# Patient Record
Sex: MALE | Race: WHITE | NOT HISPANIC OR LATINO | Employment: FULL TIME | URBAN - METROPOLITAN AREA
[De-identification: names, ages, dates, MRNs, and addresses within clinical notes are randomized per-mention and may not be internally consistent; named-entity substitution may affect disease eponyms.]

---

## 2017-03-09 ENCOUNTER — HOSPITAL ENCOUNTER (OUTPATIENT)
Dept: LAB | Facility: MEDICAL CENTER | Age: 37
End: 2017-03-09
Attending: INTERNAL MEDICINE
Payer: COMMERCIAL

## 2017-03-09 ENCOUNTER — TELEPHONE (OUTPATIENT)
Dept: MEDICAL GROUP | Facility: MEDICAL CENTER | Age: 37
End: 2017-03-09

## 2017-03-09 DIAGNOSIS — Z00.00 PREVENTATIVE HEALTH CARE: Chronic | ICD-10-CM

## 2017-03-09 LAB
25(OH)D3 SERPL-MCNC: 24 NG/ML (ref 30–100)
ALBUMIN SERPL BCP-MCNC: 4.4 G/DL (ref 3.2–4.9)
ALBUMIN/GLOB SERPL: 1.5 G/DL
ALP SERPL-CCNC: 53 U/L (ref 30–99)
ALT SERPL-CCNC: 20 U/L (ref 2–50)
ANION GAP SERPL CALC-SCNC: 5 MMOL/L (ref 0–11.9)
AST SERPL-CCNC: 21 U/L (ref 12–45)
BASOPHILS # BLD AUTO: 0.07 K/UL (ref 0–0.12)
BASOPHILS NFR BLD AUTO: 1.1 % (ref 0–1.8)
BILIRUB SERPL-MCNC: 0.7 MG/DL (ref 0.1–1.5)
BUN SERPL-MCNC: 14 MG/DL (ref 8–22)
CALCIUM SERPL-MCNC: 9.7 MG/DL (ref 8.5–10.5)
CHLORIDE SERPL-SCNC: 104 MMOL/L (ref 96–112)
CHOLEST SERPL-MCNC: 179 MG/DL (ref 100–199)
CO2 SERPL-SCNC: 30 MMOL/L (ref 20–33)
CREAT SERPL-MCNC: 0.96 MG/DL (ref 0.5–1.4)
EOSINOPHIL # BLD: 0.22 K/UL (ref 0–0.51)
EOSINOPHIL NFR BLD AUTO: 3.4 % (ref 0–6.9)
ERYTHROCYTE [DISTWIDTH] IN BLOOD BY AUTOMATED COUNT: 43.5 FL (ref 35.9–50)
GLOBULIN SER CALC-MCNC: 3 G/DL (ref 1.9–3.5)
GLUCOSE SERPL-MCNC: 81 MG/DL (ref 65–99)
HBV SURFACE AB SER RIA-ACNC: 626.57 MIU/ML (ref 0–10)
HCT VFR BLD AUTO: 48.6 % (ref 42–52)
HCV AB S/CO SERPL IA: NEGATIVE
HDLC SERPL-MCNC: 70 MG/DL
HGB BLD-MCNC: 15.7 G/DL (ref 14–18)
HIV 1+2 AB+HIV1 P24 AG SERPL QL IA: NON REACTIVE
IMM GRANULOCYTES # BLD AUTO: 0.01 K/UL (ref 0–0.11)
IMM GRANULOCYTES NFR BLD AUTO: 0.2 % (ref 0–0.9)
LDLC SERPL CALC-MCNC: 84 MG/DL
LYMPHOCYTES # BLD: 2.73 K/UL (ref 1–4.8)
LYMPHOCYTES NFR BLD AUTO: 41.9 % (ref 22–41)
MCH RBC QN AUTO: 31.3 PG (ref 27–33)
MCHC RBC AUTO-ENTMCNC: 32.3 G/DL (ref 33.7–35.3)
MCV RBC AUTO: 96.8 FL (ref 81.4–97.8)
MONOCYTES # BLD: 0.44 K/UL (ref 0–0.85)
MONOCYTES NFR BLD AUTO: 6.8 % (ref 0–13.4)
NEUTROPHILS # BLD: 3.04 K/UL (ref 1.82–7.42)
NEUTROPHILS NFR BLD AUTO: 46.6 % (ref 44–72)
NRBC # BLD AUTO: 0 K/UL
NRBC BLD-RTO: 0 /100 WBC
PLATELET # BLD AUTO: 254 K/UL (ref 164–446)
PMV BLD AUTO: 10.2 FL (ref 9–12.9)
POTASSIUM SERPL-SCNC: 4.2 MMOL/L (ref 3.6–5.5)
PROT SERPL-MCNC: 7.4 G/DL (ref 6–8.2)
RBC # BLD AUTO: 5.02 M/UL (ref 4.7–6.1)
SODIUM SERPL-SCNC: 139 MMOL/L (ref 135–145)
TRIGL SERPL-MCNC: 125 MG/DL (ref 0–149)
TSH SERPL DL<=0.005 MIU/L-ACNC: 1.84 UIU/ML (ref 0.3–3.7)
WBC # BLD AUTO: 6.5 K/UL (ref 4.8–10.8)

## 2017-03-09 PROCEDURE — 84443 ASSAY THYROID STIM HORMONE: CPT

## 2017-03-09 PROCEDURE — 82306 VITAMIN D 25 HYDROXY: CPT

## 2017-03-09 PROCEDURE — 86803 HEPATITIS C AB TEST: CPT

## 2017-03-09 PROCEDURE — 80053 COMPREHEN METABOLIC PANEL: CPT

## 2017-03-09 PROCEDURE — 86706 HEP B SURFACE ANTIBODY: CPT

## 2017-03-09 PROCEDURE — 36415 COLL VENOUS BLD VENIPUNCTURE: CPT

## 2017-03-09 PROCEDURE — 85025 COMPLETE CBC W/AUTO DIFF WBC: CPT

## 2017-03-09 PROCEDURE — 87389 HIV-1 AG W/HIV-1&-2 AB AG IA: CPT

## 2017-03-09 PROCEDURE — 80061 LIPID PANEL: CPT

## 2017-03-09 NOTE — TELEPHONE ENCOUNTER
Yes, they did draw labs but his orders were . Please enter new ones so I can call the lab and have them process the specimen draw this am.

## 2017-03-09 NOTE — TELEPHONE ENCOUNTER
1. Caller Name: EMELIA, Lab at [facility]                      Call Back Number: ext [phone]    2. Message: Pt needs his labs reordered as they have . Please let lab know when reordered.     3. Patient approves office to leave a detailed voicemail/MyChart message: N\A

## 2017-03-09 NOTE — TELEPHONE ENCOUNTER
In Epic it appears that the labs were actually collected today, I assume they do not need these reordered in the system since he apparently had his blood drawn today.

## 2017-03-10 ENCOUNTER — TELEPHONE (OUTPATIENT)
Dept: MEDICAL GROUP | Facility: MEDICAL CENTER | Age: 37
End: 2017-03-10

## 2017-03-10 NOTE — TELEPHONE ENCOUNTER
----- Message from Rd Rueda M.D. sent at 3/9/2017  8:00 PM PST -----  Please notify patient that his labs are final, his HIV is negative, his hepatitis C antibody is negative, he is immune to hepatitis B, he can schedule follow-up to discuss remainder of his labs, he has not been seen in over a year

## 2017-03-24 ENCOUNTER — OFFICE VISIT (OUTPATIENT)
Dept: MEDICAL GROUP | Facility: MEDICAL CENTER | Age: 37
End: 2017-03-24
Payer: COMMERCIAL

## 2017-03-24 VITALS
HEART RATE: 70 BPM | WEIGHT: 160 LBS | OXYGEN SATURATION: 96 % | BODY MASS INDEX: 21.67 KG/M2 | TEMPERATURE: 98 F | SYSTOLIC BLOOD PRESSURE: 114 MMHG | DIASTOLIC BLOOD PRESSURE: 70 MMHG | HEIGHT: 72 IN

## 2017-03-24 DIAGNOSIS — Z00.00 PREVENTATIVE HEALTH CARE: Chronic | ICD-10-CM

## 2017-03-24 DIAGNOSIS — R61 HYPERHIDROSIS: ICD-10-CM

## 2017-03-24 DIAGNOSIS — R79.89 LOW VITAMIN D LEVEL: ICD-10-CM

## 2017-03-24 DIAGNOSIS — F34.1 DYSTHYMIA: ICD-10-CM

## 2017-03-24 PROCEDURE — 99214 OFFICE O/P EST MOD 30 MIN: CPT | Performed by: INTERNAL MEDICINE

## 2017-03-24 ASSESSMENT — PATIENT HEALTH QUESTIONNAIRE - PHQ9
SUM OF ALL RESPONSES TO PHQ QUESTIONS 1-9: 9
5. POOR APPETITE OR OVEREATING: 0 - NOT AT ALL
CLINICAL INTERPRETATION OF PHQ2 SCORE: 4

## 2017-03-24 NOTE — PROGRESS NOTES
Subjective:      Santo Tavares is a 36 y.o. male who presents with labs          HPI  Here follow up labs  No new meds other than drysol from podiatrist for sweating on feet  Works at [employer] and has been stressful, etoh 3-6 per day, no tobacco, occasional marijuana, no other illicit drugs, has had some stress, as some depression type symptoms, no suicidal ideation, is trying to get a new job at a different branch of a [employer] system.  Occasional difficulty with sleep, some decreased energy, focus and concentration especially at work   No history of psychosis, no history of bipolar disease, no manic behavior  No hallucinations or delusions  Did have some issues with ex-girlfriend, but has broken up with her  Patient has not been exercising, trying to eat healthy diet  No tobacco            Current Outpatient Prescriptions   Medication Sig Dispense Refill   • tadalafil (CIALIS) 20 MG tablet Take 1 Tab by mouth as needed for Erectile Dysfunction (take one po prior to activity every 72 hours). 10 Tab 3   • sumatriptan (IMITREX) 100 MG tablet Take 1 Tab by mouth Once PRN for Migraine. 9 Tab 5     No current facility-administered medications for this visit.     Patient Active Problem List    Diagnosis Date Noted   • Erectile dysfunction 01/12/2016   • Preventative health care 07/28/2014   • Migraine 07/26/2010   • Irritable bowel syndrome 09/01/2009       IBS  Trial align  8/7/14 stool studies and labs negative, improved with less etoh. Consider GI evaluation if symptoms recur    Migraine    Preventative health  3/9/17 vit d 24  3/9/17 hiv negative  3/9/17 hep c antibody negative  3/9/17 hep b sAb positive  3/9/17 chol 179,trig 125,hdl 70,ldl 84    Depression Screening    Little interest or pleasure in doing things?  2 - more than half the days  Feeling down, depressed , or hopeless? 2 - more than half the days  Trouble falling or staying asleep, or sleeping too much?  3 - nearly every day  Feeling tired or having  little energy?  0 - not at all  Poor appetite or overeating?  0 - not at all  Feeling bad about yourself - or that you are a failure or have let yourself or your family down? 2 - more than half the days  Trouble concentrating on things, such as reading the newspaper or watching television? 0 - not at all  Moving or speaking so slowly that other people could have noticed.  Or the opposite - being so fidgety or restless that you have been moving around a lot more than usual?  0 - not at all  Thoughts that you would be better off dead, or of hurting yourself?  0 - not at all  Patient Health Questionnaire Score: 9  Depressive symptoms identified:             ROS       Objective:         Physical Exam   Constitutional: He appears well-developed and well-nourished. No distress.   HENT:   Head: Normocephalic and atraumatic.   Eyes: Conjunctivae are normal. Right eye exhibits no discharge. Left eye exhibits no discharge.   Neck: Neck supple. No JVD present. No thyromegaly present.   Cardiovascular: Normal rate, regular rhythm and normal heart sounds.    No murmur heard.  Pulmonary/Chest: Effort normal. No respiratory distress. He has no wheezes.   Abdominal: He exhibits no distension.   Neurological: He is alert.   Skin: Skin is warm. He is not diaphoretic.   Psychiatric: He has a normal mood and affect. His behavior is normal.   Nursing note and vitals reviewed.              Assessment/Plan:     Assessment  #!  Dysthymia PHQ 9, no suicidal ideation, no medication, drinking alcohol 3-6 per day, recent labs normal CBC, CMP, thyroid function testing    #2 hyperhidrosis feet, on Drysol per podiatry    #3 low vitamin D 24    #4 occasional marijuana usage, no psychosis, no respiratory issues    #5 EtOH 3-6 per day normal labs      Plan  #1 discussed lab results 3/9/17    #2 recommend decrease etoh increased risk of liver disease, worsening depression and mood, stop marijuana usage may worsen depression and mood    #3 start  exercising daily, continue good nutrition     #4 last modification, diet, exercise discussed    #5 declines medication for mood    #6 refer behavioral health, he agrees    #7 notify us if mood worsens with regards to mood, depression, substance abuse issues    #8 start vitamin D 4000 units today    #9 use Drysol at night apply on feet, wrap with Saran wrap, cover with sock on foot at least 8 hours    #10 follow-up 3 months

## 2017-03-24 NOTE — MR AVS SNAPSHOT
Santo Tavares   3/24/2017 7:40 AM   Office Visit   MRN: [id number]    Department:  [facility]   Dept Phone:  [phone]    Description:  Male : 1980   Provider:  Rd Rueda M.D.           Allergies as of 3/24/2017     Allergen Noted Reactions    Nkda [No Known Drug Allergy] 2010         You were diagnosed with     Hyperhidrosis   [061081]       Dysthymia   [745685]       Preventative health care   [681321]       Low vitamin D level   [7084914]         Vital Signs     Blood Pressure Pulse Temperature Height Weight Body Mass Index    114/70 mmHg 70 36.7 °C (98 °F) 1.829 m (6') 72.576 kg (160 lb) 21.70 kg/m2    Oxygen Saturation Smoking Status                96% Never Smoker           Basic Information     Date Of Birth Sex Race Ethnicity Preferred Language    1980 Male White Non- English      Problem List              ICD-10-CM Priority Class Noted - Resolved    Irritable bowel syndrome K58.9   2009 - Present    Migraine    2010 - Present    Preventative health care (Chronic) Z00.00   2014 - Present    Erectile dysfunction N52.9   2016 - Present    Hyperhidrosis L74.519   3/24/2017 - Present    Dysthymia F34.1   3/24/2017 - Present      Health Maintenance        Date Due Completion Dates    IMM DTaP/Tdap/Td Vaccine (1 - Tdap) 1997 ---    IMM INFLUENZA (1) 2016 ---            Current Immunizations     No immunizations on file.      Below and/or attached are the medications your provider expects you to take. Review all of your home medications and newly ordered medications with your provider and/or pharmacist. Follow medication instructions as directed by your provider and/or pharmacist. Please keep your medication list with you and share with your provider. Update the information when medications are discontinued, doses are changed, or new medications (including over-the-counter products) are added; and carry medication information at all  times in the event of emergency situations     Allergies:  NKDA - (reactions not documented)               Medications  Valid as of: March 24, 2017 -  8:52 AM    Generic Name Brand Name Tablet Size Instructions for use    SUMAtriptan Succinate (Tab) IMITREX 100 MG Take 1 Tab by mouth Once PRN for Migraine.        Tadalafil (Tab) CIALIS 20 MG Take 1 Tab by mouth as needed for Erectile Dysfunction (take one po prior to activity every 72 hours).        .                 Medicines prescribed today were sent to:     [id number] - [place], [place] - [address]    [address] [place] [address]    Phone: [phone] Fax: [phone]    Open 24 Hours?: No      Medication refill instructions:       If your prescription bottle indicates you have medication refills left, it is not necessary to call your provider’s office. Please contact your pharmacy and they will refill your medication.    If your prescription bottle indicates you do not have any refills left, you may request refills at any time through one of the following ways: The online [employer] system (except Urgent Care), by calling your provider’s office, or by asking your pharmacy to contact your provider’s office with a refill request. Medication refills are processed only during regular business hours and may not be available until the next business day. Your provider may request additional information or to have a follow-up visit with you prior to refilling your medication.   *Please Note: Medication refills are assigned a new Rx number when refilled electronically. Your pharmacy may indicate that no refills were authorized even though a new prescription for the same medication is available at the pharmacy. Please request the medicine by name with the pharmacy before contacting your provider for a refill.        Referral     A referral request has been sent to our patient care coordination department. Please allow 3-5 business days for us to process this  request and contact you either by phone or mail. If you do not hear from us by the 5th business day, please call us at [phone].        Other Notes About Your Plan     tdap                 MyChart Access Code: Activation code not generated  Current MyChart Status: Active

## 2017-11-27 ENCOUNTER — TELEPHONE (OUTPATIENT)
Dept: MEDICAL GROUP | Facility: MEDICAL CENTER | Age: 37
End: 2017-11-27

## 2017-11-27 DIAGNOSIS — L98.9 FACE LESION: ICD-10-CM

## 2017-11-28 NOTE — TELEPHONE ENCOUNTER
Please notify patient the referral has been placed, the referral was initially placed in January 2016 which was nearly 2 years ago

## 2017-11-28 NOTE — TELEPHONE ENCOUNTER
1. Caller Name: Santo Tavares                        Call Back Number: [phone] (home)       2. Message: Pt says he saw you a year ago and you did a REF to plastics for a lesion on his face. He did not follow through on it at the time but would like you to redo the REF without him having to come in. Please advise.     3. Patient approves office to leave a detailed voicemail/MyChart message: no

## 2018-03-13 DIAGNOSIS — G43.809 OTHER MIGRAINE WITHOUT STATUS MIGRAINOSUS, NOT INTRACTABLE: ICD-10-CM

## 2018-03-13 RX ORDER — SUMATRIPTAN 100 MG/1
100 TABLET, FILM COATED ORAL
Qty: 9 TAB | Refills: 5 | Status: SHIPPED | OUTPATIENT
Start: 2018-03-13 | End: 2024-01-27 | Stop reason: SDUPTHER

## 2018-03-14 NOTE — TELEPHONE ENCOUNTER
Please notify the patient that we have not seen him in a year, he should schedule a follow-up appointment.  I will send a prescription refill for Imitrex to his pharmacy.    Was he prescribed a prescription medication for his perspiration by the podiatrist?  There may be a topical medication we can use that is prescription strength, but he would have to come in for a visit to discuss the medication.

## 2018-03-14 NOTE — TELEPHONE ENCOUNTER
Patient advised of message below   He was scheduled for 3/20 to discuss medications with you.    He did state that yes, the podiatrist gave him a topical but it did not work well for him.

## 2018-03-14 NOTE — TELEPHONE ENCOUNTER
1. Caller Name: Santo Tavares                                         Call Back Number: [phone]      Patient approves a detailed voicemail message: yes    Pt called and LM stating he was being seen by a Podiatrist and he is no longer in practice. Pt is asking to have a foot Antiperspirant that was prescription sent to his pharmacy. Pt also requesting his Imitrex Rx to be filled.

## 2018-03-15 DIAGNOSIS — N52.9 ERECTILE DYSFUNCTION, UNSPECIFIED ERECTILE DYSFUNCTION TYPE: ICD-10-CM

## 2018-03-15 RX ORDER — TADALAFIL 20 MG/1
20 TABLET ORAL PRN
Qty: 10 TAB | Refills: 3 | Status: SHIPPED | OUTPATIENT
Start: 2018-03-15 | End: 2020-03-09

## 2018-03-20 ENCOUNTER — OFFICE VISIT (OUTPATIENT)
Dept: MEDICAL GROUP | Facility: MEDICAL CENTER | Age: 38
End: 2018-03-20
Payer: COMMERCIAL

## 2018-03-20 VITALS
BODY MASS INDEX: 23.7 KG/M2 | TEMPERATURE: 98.2 F | HEART RATE: 62 BPM | DIASTOLIC BLOOD PRESSURE: 64 MMHG | WEIGHT: 175 LBS | OXYGEN SATURATION: 97 % | HEIGHT: 72 IN | SYSTOLIC BLOOD PRESSURE: 106 MMHG

## 2018-03-20 DIAGNOSIS — R61 HYPERHIDROSIS: ICD-10-CM

## 2018-03-20 DIAGNOSIS — Z00.00 PREVENTATIVE HEALTH CARE: Chronic | ICD-10-CM

## 2018-03-20 DIAGNOSIS — F34.1 DYSTHYMIA: ICD-10-CM

## 2018-03-20 DIAGNOSIS — F10.20 ETOHISM (HCC): ICD-10-CM

## 2018-03-20 DIAGNOSIS — F32.A DEPRESSION, UNSPECIFIED DEPRESSION TYPE: ICD-10-CM

## 2018-03-20 PROCEDURE — 99214 OFFICE O/P EST MOD 30 MIN: CPT | Performed by: INTERNAL MEDICINE

## 2018-03-20 ASSESSMENT — PATIENT HEALTH QUESTIONNAIRE - PHQ9
CLINICAL INTERPRETATION OF PHQ2 SCORE: 4
SUM OF ALL RESPONSES TO PHQ QUESTIONS 1-9: 9
5. POOR APPETITE OR OVEREATING: 0 - NOT AT ALL

## 2018-03-20 NOTE — PROGRESS NOTES
Subjective:      Santo Tavares is a 37 y.o. male who presents with Medication Refill            HPI     Here for excessive perspiration feet, tried drysol on feet for a year or so from podiatrist, does have sweating, no other part of the body, has tried the drysol and powder for feet. Did the drysol over a year ago, tried for one year.  Used the medication during the day and was not effective.  No excessive perspiration of his hands or axillary region.  Does not appear to be related to stress, exercise, anxiety, diet.  Migraine has 5 per year he will use Imitrex as needed.  Erectile dysfunction on Cialis, needs refill, does not appear to make migraines worse.  Some anxiety at times, still drinking 3-6 beers per night, occasional marijuana, no tobacco, no other illicit drugs  Some episodes of depression, good social support, improved job stress, tries to keep active.  Referral made last year to behavior health, patient did not make appointment  No suicidal ideation, no bipolar disease, no manic behavior  Some decreased interest, fatigue, concentration at times.  Occasional insomnia           Current Outpatient Prescriptions   Medication Sig Dispense Refill   • tadalafil (CIALIS) 20 MG tablet Take 1 Tab by mouth as needed for Erectile Dysfunction. One by mouth prior to activity, maximum one tablet every 72 hours 10 Tab 3   • sumatriptan (IMITREX) 100 MG tablet Take 1 Tab by mouth Once PRN for Migraine (max one per day) for up to 1 dose. 9 Tab 5   • tadalafil (CIALIS) 20 MG tablet Take 1 Tab by mouth as needed for Erectile Dysfunction (one by mouth every 72 hours prior to activity). 5 Tab 7     No current facility-administered medications for this visit.      Patient Active Problem List   Diagnosis   • Irritable bowel syndrome   • Migraine   • Preventative health care   • Erectile dysfunction   • Hyperhidrosis   • Dysthymia       IBS  Trial align  8/7/14 stool studies and labs negative, improved with less etoh.  Consider GI evaluation if symptoms recur     Migraine     Preventative health  3/9/17 vit d 24  3/9/17 hiv negative  3/9/17 hep c antibody negative  3/9/17 hep b sAb positive  3/9/17 chol 179,trig 125,hdl 70,ldl 84       Depression Screening    Little interest or pleasure in doing things?  2 - more than half the days  Feeling down, depressed , or hopeless? 2 - more than half the days  Trouble falling or staying asleep, or sleeping too much?  2 - more than half the days  Feeling tired or having little energy?  1 - several days  Poor appetite or overeating?  0 - not at all  Feeling bad about yourself - or that you are a failure or have let yourself or your family down? 2 - more than half the days  Trouble concentrating on things, such as reading the newspaper or watching television? 0 - not at all  Moving or speaking so slowly that other people could have noticed.  Or the opposite - being so fidgety or restless that you have been moving around a lot more than usual?  0 - not at all  Thoughts that you would be better off dead, or of hurting yourself?  0 - not at all  Patient Health Questionnaire Score: 9    If depressive symptoms identified deferred to follow up visit unless specifically addressed in assessment and plan.    Interpretation of PHQ-9 Total Score   Score Severity   1-4 No Depression   5-9 Mild Depression   10-14 Moderate Depression   15-19 Moderately Severe Depression   20-27 Severe Depression      Health Maintenance Summary                IMM DTaP/Tdap/Td Vaccine Overdue 8/29/1997     IMM INFLUENZA Overdue 9/1/2017           Patient Care Team:  Rd Rueda M.D. as PCP - General    ROS       Objective:     /64   Pulse 62   Temp 36.8 °C (98.2 °F)   Ht 1.829 m (6')   Wt 79.4 kg (175 lb)   SpO2 97%   BMI 23.73 kg/m²      Physical Exam   Constitutional: He appears well-developed and well-nourished. No distress.   HENT:   Head: Normocephalic and atraumatic.   Eyes: Conjunctivae are normal.    Neck: Neck supple.   Cardiovascular: Normal rate, regular rhythm and normal heart sounds.    Pulmonary/Chest: Effort normal and breath sounds normal. No respiratory distress. He has no wheezes.   Abdominal: He exhibits no distension.   Neurological: He is alert.   Skin: Skin is warm. He is not diaphoretic.   Psychiatric: He has a normal mood and affect. His behavior is normal.   Nursing note and vitals reviewed.              Assessment/Plan:     Assessment  #! hyperhydrosis failed drysol    #2 EtOH 3-6 per day    #3 dysthymia phq score 9 with similar to last year, referral made last year to behavior health, patient did not go    #4 erectile dysfunction on Cialis without side effects      Plan  #! Drysol to cheryl raleys, use nightly apply to feet, rapid area with Saran wrap and sock, rinse off in the morning    #2 botox referral to dermatology for persistent hyperhidrosis having failed Drysol    #3 labs     #4 refill of Cialis monitor for headache    #5 referral to behavior [facility]    #6 declines medication for depression    #7 decrease alcohol intake, alcohol increase his risk for worsening depression, liver inflammation, liver cancer, also recommended no marijuana with alcohol as that can also be a depressant    #8 nutrition, diet, exercise discussed    #9 continue Imitrex, can use ibuprofen 800 mg with medication as needed monitor for GI upset    #10 follow-up 3 months

## 2018-03-27 ENCOUNTER — PATIENT OUTREACH (OUTPATIENT)
Dept: HEALTH INFORMATION MANAGEMENT | Facility: OTHER | Age: 38
End: 2018-03-27

## 2018-03-27 NOTE — PROGRESS NOTES
Attempt #:1    WebIZ Checked & Epic Updated: yes  HealthConnect Verified: yes  Verify PCP: yes    Communication Preference Obtained: NO     Review Care Team: NO            Care Gap Scheduling (Attempt to Schedule EACH Overdue Care Gap!)  Health Maintenance Due   Topic Date Due   • IMM DTaP/Tdap/Td Vaccine (1 - Tdap) 08/29/1997   • IMM INFLUENZA (1) 09/01/2017     SCHEDULED TDAP AND DECLINED FLU SHOT    MyChart Activation: already active

## 2018-04-03 ENCOUNTER — NON-PROVIDER VISIT (OUTPATIENT)
Dept: MEDICAL GROUP | Facility: MEDICAL CENTER | Age: 38
End: 2018-04-03
Payer: COMMERCIAL

## 2018-04-03 DIAGNOSIS — Z23 NEED FOR TDAP VACCINATION: ICD-10-CM

## 2018-04-03 DIAGNOSIS — R61 HYPERHIDROSIS: ICD-10-CM

## 2018-04-03 DIAGNOSIS — Z00.00 PREVENTATIVE HEALTH CARE: Chronic | ICD-10-CM

## 2018-04-03 PROCEDURE — 90715 TDAP VACCINE 7 YRS/> IM: CPT | Performed by: INTERNAL MEDICINE

## 2018-04-03 PROCEDURE — 90471 IMMUNIZATION ADMIN: CPT | Performed by: INTERNAL MEDICINE

## 2018-04-03 NOTE — TELEPHONE ENCOUNTER
Patient presented for tdap, vaccine provided.    Hyperhidrosis, Drysol prescribed but unavailable, written prescription printed for patient to take the various pharmacies to obtain.  Has appointment in May with dermatology for hyperhidrosis.

## 2018-04-03 NOTE — TELEPHONE ENCOUNTER
Drysol    Pt is having a hard time filling, would like hard copy he can carry to pharmacy    Was the patient seen in the last year in this department? Yes Last 3/20/18    Does patient have an active prescription for medications requested? No     Received Request Via: Patient

## 2018-04-03 NOTE — PROGRESS NOTES
"Santo Tavares is a 37 y.o. male here for a non-provider visit for:   TDAP    Reason for immunization: Overdue/Provider Recommended  Immunization records indicate need for vaccine: Yes, confirmed with Epic  Minimum interval has been met for this vaccine: Yes  ABN completed: Not Indicated    Order and dose verified by: LUIS DANIEL  VIS Dated  2/24/15 was given to patient: Yes  All IAC Questionnaire questions were answered \"No.\"    Patient tolerated injection and no adverse effects were observed or reported: Yes    Pt scheduled for next dose in series: Not Indicated    "

## 2018-04-05 ENCOUNTER — HOSPITAL ENCOUNTER (OUTPATIENT)
Dept: LAB | Facility: MEDICAL CENTER | Age: 38
End: 2018-04-05
Attending: INTERNAL MEDICINE
Payer: COMMERCIAL

## 2018-04-05 DIAGNOSIS — Z00.00 PREVENTATIVE HEALTH CARE: Chronic | ICD-10-CM

## 2018-04-05 LAB
25(OH)D3 SERPL-MCNC: 31 NG/ML (ref 30–100)
ALBUMIN SERPL BCP-MCNC: 4.3 G/DL (ref 3.2–4.9)
ALBUMIN/GLOB SERPL: 1.4 G/DL
ALP SERPL-CCNC: 45 U/L (ref 30–99)
ALT SERPL-CCNC: 16 U/L (ref 2–50)
ANION GAP SERPL CALC-SCNC: 4 MMOL/L (ref 0–11.9)
AST SERPL-CCNC: 24 U/L (ref 12–45)
BASOPHILS # BLD AUTO: 1.3 % (ref 0–1.8)
BASOPHILS # BLD: 0.09 K/UL (ref 0–0.12)
BILIRUB SERPL-MCNC: 0.7 MG/DL (ref 0.1–1.5)
BUN SERPL-MCNC: 17 MG/DL (ref 8–22)
CALCIUM SERPL-MCNC: 9.5 MG/DL (ref 8.5–10.5)
CHLORIDE SERPL-SCNC: 101 MMOL/L (ref 96–112)
CHOLEST SERPL-MCNC: 149 MG/DL (ref 100–199)
CO2 SERPL-SCNC: 31 MMOL/L (ref 20–33)
CREAT SERPL-MCNC: 1.15 MG/DL (ref 0.5–1.4)
EOSINOPHIL # BLD AUTO: 0.25 K/UL (ref 0–0.51)
EOSINOPHIL NFR BLD: 3.5 % (ref 0–6.9)
ERYTHROCYTE [DISTWIDTH] IN BLOOD BY AUTOMATED COUNT: 42.3 FL (ref 35.9–50)
EST. AVERAGE GLUCOSE BLD GHB EST-MCNC: 105 MG/DL
GLOBULIN SER CALC-MCNC: 3 G/DL (ref 1.9–3.5)
GLUCOSE SERPL-MCNC: 85 MG/DL (ref 65–99)
HBA1C MFR BLD: 5.3 % (ref 0–5.6)
HCT VFR BLD AUTO: 46.6 % (ref 42–52)
HDLC SERPL-MCNC: 60 MG/DL
HGB BLD-MCNC: 15.5 G/DL (ref 14–18)
HIV 1+2 AB+HIV1 P24 AG SERPL QL IA: NON REACTIVE
IMM GRANULOCYTES # BLD AUTO: 0.01 K/UL (ref 0–0.11)
IMM GRANULOCYTES NFR BLD AUTO: 0.1 % (ref 0–0.9)
LDLC SERPL CALC-MCNC: 70 MG/DL
LYMPHOCYTES # BLD AUTO: 2.6 K/UL (ref 1–4.8)
LYMPHOCYTES NFR BLD: 36.3 % (ref 22–41)
MCH RBC QN AUTO: 31.9 PG (ref 27–33)
MCHC RBC AUTO-ENTMCNC: 33.3 G/DL (ref 33.7–35.3)
MCV RBC AUTO: 95.9 FL (ref 81.4–97.8)
MONOCYTES # BLD AUTO: 0.74 K/UL (ref 0–0.85)
MONOCYTES NFR BLD AUTO: 10.3 % (ref 0–13.4)
NEUTROPHILS # BLD AUTO: 3.47 K/UL (ref 1.82–7.42)
NEUTROPHILS NFR BLD: 48.5 % (ref 44–72)
NRBC # BLD AUTO: 0 K/UL
NRBC BLD-RTO: 0 /100 WBC
PLATELET # BLD AUTO: 229 K/UL (ref 164–446)
PMV BLD AUTO: 10.2 FL (ref 9–12.9)
POTASSIUM SERPL-SCNC: 4 MMOL/L (ref 3.6–5.5)
PROT SERPL-MCNC: 7.3 G/DL (ref 6–8.2)
RBC # BLD AUTO: 4.86 M/UL (ref 4.7–6.1)
SODIUM SERPL-SCNC: 136 MMOL/L (ref 135–145)
TRIGL SERPL-MCNC: 94 MG/DL (ref 0–149)
TSH SERPL DL<=0.005 MIU/L-ACNC: 1.72 UIU/ML (ref 0.38–5.33)
WBC # BLD AUTO: 7.2 K/UL (ref 4.8–10.8)

## 2018-04-05 PROCEDURE — 84443 ASSAY THYROID STIM HORMONE: CPT

## 2018-04-05 PROCEDURE — 80061 LIPID PANEL: CPT

## 2018-04-05 PROCEDURE — 82306 VITAMIN D 25 HYDROXY: CPT

## 2018-04-05 PROCEDURE — 85025 COMPLETE CBC W/AUTO DIFF WBC: CPT

## 2018-04-05 PROCEDURE — 83036 HEMOGLOBIN GLYCOSYLATED A1C: CPT

## 2018-04-05 PROCEDURE — 80053 COMPREHEN METABOLIC PANEL: CPT

## 2018-04-05 PROCEDURE — 87389 HIV-1 AG W/HIV-1&-2 AB AG IA: CPT

## 2018-04-05 PROCEDURE — 36415 COLL VENOUS BLD VENIPUNCTURE: CPT

## 2018-04-06 ENCOUNTER — TELEPHONE (OUTPATIENT)
Dept: MEDICAL GROUP | Facility: MEDICAL CENTER | Age: 38
End: 2018-04-06

## 2018-04-06 NOTE — TELEPHONE ENCOUNTER
Called patient left message  Please notify patient that his blood tests show:  (1) his liver function, kidney function, blood sugar tests are normal  (2) his total cholesterol is excellent 149, good cholesterol is 60 (goal is above 40), bad cholesterol is 70 (goal is less than 100)  (3) HIV test is negative  (4) thyroid and vitamin D levels are normal

## 2018-04-06 NOTE — TELEPHONE ENCOUNTER
----- Message from Rd Rueda M.D. sent at 4/6/2018 12:07 AM PDT -----  Called patient left message  Please notify patient that his blood tests show:  (1) his liver function, kidney function, blood sugar tests are normal  (2) his total cholesterol is excellent 149, good cholesterol is 60 (goal is above 40), bad cholesterol is 70 (goal is less than 100)  (3) HIV test is negative  (4) thyroid and vitamin D levels are normal

## 2018-04-13 ENCOUNTER — OFFICE VISIT (OUTPATIENT)
Dept: BEHAVIORAL HEALTH | Facility: PHYSICIAN GROUP | Age: 38
End: 2018-04-13
Payer: COMMERCIAL

## 2018-04-13 DIAGNOSIS — F10.10 ALCOHOL ABUSE: ICD-10-CM

## 2018-04-13 DIAGNOSIS — F34.1 DYSTHYMIA: ICD-10-CM

## 2018-04-13 PROCEDURE — 90791 PSYCH DIAGNOSTIC EVALUATION: CPT | Performed by: SOCIAL WORKER

## 2018-04-13 NOTE — BH THERAPY
[facility]  INITIAL ASSESSMENT    Name: Santo Tavares  MRN: [id number]  : 1980  Age: 37 y.o.  Date of assessment: 2018  PCP: Rd Rueda M.D.  Persons in attendance: Patient  Total session time: 45 minutes      CHIEF COMPLAINT AND HISTORY OF PRESENTING PROBLEM:  (as stated by Patient):  Santo Tavares is a 37 y.o., White male referred for assessment by Rd Rueda M.D..  Primary presenting issue includes alcohol use   Chief Complaint   Patient presents with   • Initial  Evaluation   ''i feel better lately.'' patient reports feeling anxiety in march and feeling depressed.Santo reports his anxiety are work related. ''i had the option of moving but I had anxiety and I switched back.''     Anxiety: Santo reports having a panic attack once in the past few months. ''i think about things too much.'' patient reports in the past two months he has been feeling more anxiety.     Depression: ''it feels so much better'' ''it feels that I let go and before I thought nothing matters.'' (depresison back in -) patient reports having similar feelings last year during this year. (3-4 rated today)     Patient drinks alcohol daily. ''i would like to stop drinking and motivate myself for changes.'' patient has a couple of shots and 3-4 beer daily. One day off in the past two weeks. Patient has been drinking in the morning.  Santo reports he will drink more on weekends. Patient scaled his alcohol abuse 8. (1-10) ''i havent got caught and I haven't had troubles at work. Santo reports he drinks when he is socialize.     FAMILY/SOCIAL HISTORY  Current living situation/household members: lives alone.   Relevant family history/structure/dynamics: [place]   Current family/social stressors: ''i am a introvert and I like it.''   Quality/quantity of current family and/or social support: social support out of state    Does patient/parent report a family history of  behavioral health issues, diagnoses, or treatment? Yes  History reviewed. No pertinent family history.     BEHAVIORAL HEALTH TREATMENT HISTORY  Does patient/parent report a history of prior behavioral health treatment for patient? Yes:    Dates Level of Care Facilty/Provider Diagnosis/Problem Medications   2010 (2-3 sessions)  [facility]  depression                                                                          History of untreated behavioral health issues identified? No    MEDICAL HISTORY  Primary care behavioral health screenings: Patient Health Questionaire                                     If depressive symptoms identified deferred to follow up visit unless specifically addressed in assesment and plan.    Interpretation of PHQ-9 Total Score   Score Severity   1-4 No Depression   5-9 Mild Depression   10-14 Moderate Depression   15-19 Moderately Severe Depression   20-27 Severe Depression       Past medical/surgical history:   Past Medical History:   Diagnosis Date   • Herpes    • IBS (irritable bowel syndrome)    • Migraine       History reviewed. No pertinent surgical history.     Medication Allergies:  Nkda [no known drug allergy]   Medical history provided by patient during current evaluation: yes     Patient reports last physical exam:  2018  Does patient/parent report any history of or current developmental concerns? No  Does patient/parent report nutritional concerns? No  Does patient/parent report change in appetite or weight loss/gain? No  Does patient/parent report history of eating disorder symptoms? No  Does patient/parent report dental problem? No  Does patient/parent report physical pain? No   Indicate if pain is acute or chronic, and location: n.a    Pain scale rating:       Does patient/parent report functional impact of medical, developmental, or pain issues?   no    EDUCATIONAL/LEARNING HISTORY  Is patient currently enrolled in a school/educational program?   No:   Highest grade  level completed: masters     EMPLOYMENT/RESOURCES  Is the patient currently employed? Yes  Does the patient/parent report adequate financial resources? Yes  Does patient identify impact of presenting issue on work functioning? No  Work or income-related stressors:  N.a      HISTORY:  Does patient report current or past enlistment? No    [If yes, complete below items]  Does patient report history of exposure to combat? No  Does patient report history of  sexual trauma? No  Does patient report other -related stressors? No    SPIRITUAL/CULTURAL/IDENTITY:  What are the patient’s/family’s spiritual beliefs or practices? N.a   Does the patient identify any spiritual/cultural/identity factors as relevant to the presenting issue? No    LEGAL HISTORY  Has the patient ever been involved with juvenile, adult, or family legal systems? No   [If yes, trigger section below:]  Does patient report ever being a victim of a crime?  No  Does patient report involvement in any current legal issues?  No  Does patient report ever being arrested or committing a crime? No  Does patient report any current agency (parole/probation/CPS/) involvement? No    ABUSE/NEGLECT/TRAUMA SCREENING  Does patient report feeling “unsafe” in his/her home, or afraid of anyone? No  Does patient report any history of physical, sexual, or emotional abuse? No  Does parent or significant other report any of the above? No  Is there evidence of neglect by self? No  Is there evidence of neglect by a caregiver? No  Does the patient/parent report any history of CPS/APS/police involvement related to suspected abuse/neglect or domestic violence? No  Does the patient/parent report any other history of potentially traumatic life events? No  Based on the information provided during the current assessment, is a mandated report of suspected abuse/neglect being made?  No     SAFETY ASSESSMENT - SELF  Does patient acknowledge current or  past symptoms of dangerousness to self? No  Does parent/significant other report patient has current or past symptoms of dangerousness to self? No      Recent change in frequency/specificity/intensity of suicidal thoughts or self-harm behavior? No  Current access to firearms, medications, or other identified means of suicide/self-harm? No  If yes, willing to restrict access to means of suicide/self-harm? n.a  Protective factors present: n.a    Current Suicide Risk: Not applicable  Crisis Safety Plan completed and copy given to patient: n.a    SAFETY ASSESSMENT - OTHERS  Does paor past symptoms of aggressive behavior or risk to others? No  Does parent/significant othtient acknowledge current or past symptoms of aggressive behavior or risk to others? no  Does parent/significant other report patient has current or past symptoms of aggressive behavior or risk to others? No    Recent change in frequency/specificity/intensity of thoughts or threats to harm others? No  Current access to firearms/other identified means of harm? No  If yes, willing to restrict access to weapons/means of harm? n.a  Protective factors present: n.a    Current Homicide Risk:  Not applicable  Crisis Safety Plan completed and copy given to patient? n.a  Based on information provided during the current assessment, is a mandated “duty to warn” being exercised? n.a    SUBSTANCE USE/ADDICTION HISTORY  [] Not applicable - patient 10 years of age or younger    Is there a family history of substance use/addiction? Yes- grandparents on both sides   Does patient acknowledge or parent/significant other report use of/dependence on substances? Yes  Last time patient used alcohol: couple shots. 25 oz bottles (3-5 beers) . Last four years  Within the past week? Yes  Last time patient used marijuana: daily ''couple bowls in evening   Within the past month? Yes  Any other street drugs ever tried even once? No  Any use of prescription medications/pills without a  prescription, or for reasons others than originally prescribed?  No  Any other addictive behavior reported (gambling, shopping, sex)? No     Drug History:  Amphetamine:  Amphetamine frequency: Never used      Cannibis:  Cannabis frequency: Daily      Cocaine:  Cocaine frequency: Never used      Ecstasy:  Ecstasy frequency: Never used      Hallucinogen:  Hallucinogen frequency: Never used      Inhalant:   Inhalant frequency: Never used      Opiate:  Opiate frequency: Never used  Cannabis frequency: Daily      Other:  Other drug frequency: Never used      Sedative:   Sedative frequency: Never used            [] Patient denies use of any substance/addictive behaviors    STRENGTHS/ASSETS  Strengths Identified by interviewer: Insight into problems, Self-awareness, Family suppport, Social support and Stable relationships      MENTAL STATUS/OBSERVATIONS   Participation: Active verbal participation  Grooming: Casual and Neat  Orientation:Alert   Behavior: Calm and Agitated  Eye contact: Good   Mood:Euthymic  Affect:Congruent with content  Thought process: Logical  Thought content:  Within normal limits  Speech: Rate within normal limits  Perception: Within normal limits  Memory: No gross evidence of memory deficits  Insight: Good  Judgment:  Good  Other:    Family/couple interaction observations: n.a     RESULTS OF SCREENING MEASURES:  [] Not applicable  Measure:   Score:     Measure:   Score:       CLINICAL FORMULATION: patient was educated on IOP. He would like to meet with this writer for monthly sessions and then determine if he would like a higher level of care to treat his alcohol use. He has been refferd to AA meeting and bi-weekly therapy session. Patient denies having anxiety or depression but reports struggling with his alcohol  intake        DIAGNOSTIC IMPRESSION(S):  1. Dysthymia          IDENTIFIED NEEDS/PLAN:  [If any of these marked, trigger DISPOSITION list]  Mood/anxiety  Refer to [facility]:  Outpatient Therapy    Does patient express agreement with the above plan? Yes     Referral appointment(s) scheduled? Yes       Anabelle Joiner L.C.S.W.

## 2018-04-23 ENCOUNTER — APPOINTMENT (RX ONLY)
Dept: URBAN - METROPOLITAN AREA CLINIC 20 | Facility: CLINIC | Age: 38
Setting detail: DERMATOLOGY
End: 2018-04-23

## 2018-04-23 DIAGNOSIS — L74.51 PRIMARY FOCAL HYPERHIDROSIS: ICD-10-CM

## 2018-04-23 PROBLEM — L74.513 PRIMARY FOCAL HYPERHIDROSIS, SOLES: Status: ACTIVE | Noted: 2018-04-23

## 2018-04-23 PROCEDURE — 99201: CPT

## 2018-04-23 PROCEDURE — ? ADDITIONAL NOTES

## 2018-04-23 ASSESSMENT — LOCATION ZONE DERM
LOCATION ZONE: TOE
LOCATION ZONE: FEET

## 2018-04-23 ASSESSMENT — LOCATION DETAILED DESCRIPTION DERM
LOCATION DETAILED: RIGHT DORSAL FOOT
LOCATION DETAILED: LEFT DORSAL 3RD TOE

## 2018-04-23 ASSESSMENT — LOCATION SIMPLE DESCRIPTION DERM
LOCATION SIMPLE: LEFT 3RD TOE
LOCATION SIMPLE: RIGHT FOOT

## 2018-04-23 NOTE — HPI: SWEATING (HYPERHIDROSIS)
Sweating Severity Scale: 2- The sweating is tolerable but sometimes interferes with daily activities
How Severe Is It?: moderate
Is This A New Presentation, Or A Follow-Up?: Sweating
Additional History: Patient states that he was seen 1 year ago by podiatrist and was given drysol which helped a little. Podiatrist is no longer in clinic and went back to PCP who recommended increasing drysol and consider Botox.

## 2018-06-01 ENCOUNTER — APPOINTMENT (OUTPATIENT)
Dept: BEHAVIORAL HEALTH | Facility: PHYSICIAN GROUP | Age: 38
End: 2018-06-01

## 2018-06-08 DIAGNOSIS — R61 HYPERHIDROSIS: ICD-10-CM

## 2018-06-22 ENCOUNTER — APPOINTMENT (OUTPATIENT)
Dept: BEHAVIORAL HEALTH | Facility: PHYSICIAN GROUP | Age: 38
End: 2018-06-22

## 2018-11-16 ENCOUNTER — TELEPHONE (OUTPATIENT)
Dept: MEDICAL GROUP | Facility: MEDICAL CENTER | Age: 38
End: 2018-11-16

## 2018-11-16 DIAGNOSIS — L98.9 FACE LESION: ICD-10-CM

## 2018-11-16 NOTE — TELEPHONE ENCOUNTER
1. Caller Name: Santo Tavares                        Call Back Number: [phone] (home)       2. Message: Pt requesting another REF to Plastics for removal of lesion on his face. The REF you did for him previously is no longer valid.     3. Patient approves office to leave a detailed voicemail/MyChart message: no

## 2019-11-12 ENCOUNTER — TELEPHONE (OUTPATIENT)
Dept: MEDICAL GROUP | Facility: MEDICAL CENTER | Age: 39
End: 2019-11-12

## 2019-11-12 DIAGNOSIS — R61 HYPERHIDROSIS: ICD-10-CM

## 2019-11-12 RX ORDER — TADALAFIL 20 MG/1
20 TABLET ORAL PRN
Qty: 10 TAB | Refills: 1 | Status: SHIPPED | OUTPATIENT
Start: 2019-11-12 | End: 2021-02-17 | Stop reason: SDUPTHER

## 2019-11-12 NOTE — TELEPHONE ENCOUNTER
Please notify the patient he needs to be seen for his annual examination, he has not been seen since March 2018.

## 2020-03-09 ENCOUNTER — OFFICE VISIT (OUTPATIENT)
Dept: MEDICAL GROUP | Facility: MEDICAL CENTER | Age: 40
End: 2020-03-09
Payer: COMMERCIAL

## 2020-03-09 VITALS
HEART RATE: 54 BPM | SYSTOLIC BLOOD PRESSURE: 124 MMHG | BODY MASS INDEX: 24.11 KG/M2 | OXYGEN SATURATION: 98 % | HEIGHT: 72 IN | DIASTOLIC BLOOD PRESSURE: 82 MMHG | WEIGHT: 178 LBS | TEMPERATURE: 97.8 F

## 2020-03-09 DIAGNOSIS — R61 HYPERHIDROSIS: ICD-10-CM

## 2020-03-09 DIAGNOSIS — L98.9 SKIN LESION: ICD-10-CM

## 2020-03-09 DIAGNOSIS — Z00.00 PREVENTATIVE HEALTH CARE: Chronic | ICD-10-CM

## 2020-03-09 PROBLEM — F10.10 ALCOHOL ABUSE: Status: RESOLVED | Noted: 2018-04-13 | Resolved: 2020-03-09

## 2020-03-09 PROBLEM — Z86.59 HISTORY OF DYSTHYMIA: Status: ACTIVE | Noted: 2017-03-24

## 2020-03-09 PROCEDURE — 99396 PREV VISIT EST AGE 40-64: CPT | Performed by: INTERNAL MEDICINE

## 2020-03-09 RX ORDER — ALUMINUM CHLORIDE 20 %
SOLUTION, NON-ORAL TOPICAL
Qty: 1 BOTTLE | Refills: 6 | Status: SHIPPED | OUTPATIENT
Start: 2020-03-09 | End: 2021-04-06

## 2020-03-09 ASSESSMENT — PATIENT HEALTH QUESTIONNAIRE - PHQ9
5. POOR APPETITE OR OVEREATING: NOT AT ALL
6. FEELING BAD ABOUT YOURSELF - OR THAT YOU ARE A FAILURE OR HAVE LET YOURSELF OR YOUR FAMILY DOWN: NOT AL ALL
SUM OF ALL RESPONSES TO PHQ9 QUESTIONS 1 AND 2: 0
2. FEELING DOWN, DEPRESSED, IRRITABLE, OR HOPELESS: NOT AT ALL
1. LITTLE INTEREST OR PLEASURE IN DOING THINGS: NOT AT ALL
8. MOVING OR SPEAKING SO SLOWLY THAT OTHER PEOPLE COULD HAVE NOTICED. OR THE OPPOSITE, BEING SO FIGETY OR RESTLESS THAT YOU HAVE BEEN MOVING AROUND A LOT MORE THAN USUAL: NOT AT ALL
3. TROUBLE FALLING OR STAYING ASLEEP OR SLEEPING TOO MUCH: NOT AT ALL
SUM OF ALL RESPONSES TO PHQ QUESTIONS 1-9: 0
4. FEELING TIRED OR HAVING LITTLE ENERGY: NOT AT ALL
9. THOUGHTS THAT YOU WOULD BE BETTER OFF DEAD, OR OF HURTING YOURSELF: NOT AT ALL
7. TROUBLE CONCENTRATING ON THINGS, SUCH AS READING THE NEWSPAPER OR WATCHING TELEVISION: NOT AT ALL

## 2020-03-09 ASSESSMENT — FIBROSIS 4 INDEX: FIB4 SCORE: 1.07

## 2020-03-09 NOTE — PROGRESS NOTES
Subjective:      Santo Tavares is a 39 y.o. male who presents with Annual Exam            HPI     Annual exam   Medication, allergies, medical history, surgical history, social history, family history reviewed and updated  BETH works at [employer], does exercising skiing, mountain bike, and gym does stationary bike and weight training 2-4 days per week, tries to eat healthy, chicken and fish, salad, tries to limit sweets and candies, etoh 6-10 beer per week more on weekends, coffee a few days per week, no soda, does drink water, no tobacco  Eye exam annually uses glasses for reading and distance  Teeth cleaning twice per year  Family history Father thyroid disease mother is healthy siblings are healthy  Has skin lesion right chin  Occasional nasal dryness, no allergies, no postnasal drip  Hyperhidrosis on Drysol, has seen dermatology, Botox not covered    Current Outpatient Medications   Medication Sig Dispense Refill   • aluminum chloride (DRYSOL) 20 % external solution APPLY TO AFFECTED AREA(S) EVERY EVENING 35 mL 1   • tadalafil (CIALIS) 20 MG tablet Take 1 Tab by mouth as needed for Erectile Dysfunction (take one tab prn at least one hour prior to activity, max one per 72 hours). 10 Tab 1   • sumatriptan (IMITREX) 100 MG tablet Take 1 Tab by mouth Once PRN for Migraine (max one per day) for up to 1 dose. 9 Tab 5   • tadalafil (CIALIS) 20 MG tablet Take 1 Tab by mouth as needed for Erectile Dysfunction (one by mouth every 72 ours as needed prior to activity). (Patient not taking: Reported on 3/9/2020) 10 Tab 6   • tadalafil (CIALIS) 20 MG tablet Take 1 Tab by mouth as needed for Erectile Dysfunction. One by mouth prior to activity, maximum one tablet every 72 hours (Patient not taking: Reported on 3/9/2020) 10 Tab 3     No current facility-administered medications for this visit.      Patient Active Problem List   Diagnosis   • Irritable bowel syndrome   • Migraine   • Preventative health care   • Erectile  dysfunction   • Hyperhidrosis   • Dysthymia   • ETOHism (HCC)   • Alcohol abuse            Health Maintenance Summary                IMM INFLUENZA Overdue 9/1/2019     IMM DTaP/Tdap/Td Vaccine Next Due 4/3/2028      Done 4/3/2018 Imm Admin: Tdap Vaccine          Patient Care Team:  Rd Rueda M.D. as PCP - General      ROS       Objective:     /82 (BP Location: Right arm, Patient Position: Sitting, BP Cuff Size: Adult)   Pulse (!) 54   Temp 36.6 °C (97.8 °F)   Ht 1.829 m (6')   Wt 80.7 kg (178 lb)   SpO2 98%   BMI 24.14 kg/m²      Physical Exam  Vitals signs and nursing note reviewed.   Constitutional:       Appearance: Normal appearance.   HENT:      Head: Normocephalic and atraumatic.      Right Ear: Tympanic membrane normal.      Left Ear: Tympanic membrane normal.      Nose: No congestion.      Mouth/Throat:      Pharynx: No oropharyngeal exudate.   Eyes:      General:         Right eye: No discharge.         Left eye: No discharge.   Neck:      Musculoskeletal: Neck supple.   Cardiovascular:      Rate and Rhythm: Normal rate and regular rhythm.      Heart sounds: Normal heart sounds. No murmur.   Pulmonary:      Effort: Pulmonary effort is normal. No respiratory distress.      Breath sounds: Normal breath sounds.   Abdominal:      General: There is no distension.   Skin:     General: Skin is warm.   Neurological:      General: No focal deficit present.      Mental Status: He is alert.   Psychiatric:         Mood and Affect: Mood normal.         Behavior: Behavior normal.       Right skin lesion, raised, circumferential, nontender, no color change, irregular border, no evidence of folliculitis          Assessment/Plan:     Assessment  #! Annual exam     #2  History of anxiety stable improved with work environment, no depression    #3 right lower chin lesion    #4 occasional nasal dryness    #5 EtOH intake on weekends    #6 hyperhidrosis on Drysol    Plan  #!  Declines flu vaccine    #2  up-to-date on tetanus    #3 nutrition, diet, exercise discussed    #4 work on meditation, relaxation therapy, yoga, continue stretching, aerobic exercise    #5 referral plastic surgery Dr. Virk right chin lesion    #6 refill Drysol, patient has had difficulty finding this, advised him to call different pharmacies, prescription provided, if he can find the pharmacy we can call that in for him, also check with his insurance regarding mail away option for Drysol    #7 follow-up 1 year    #8 labs ordered including HIV screening

## 2020-06-30 ENCOUNTER — TELEPHONE (OUTPATIENT)
Dept: MEDICAL GROUP | Facility: MEDICAL CENTER | Age: 40
End: 2020-06-30

## 2020-06-30 NOTE — TELEPHONE ENCOUNTER
Santo Tavares  [phone]    Pt called wanting to know what labs were ordered because he lost his lab slip. LM for pt to call if he needed anything further.

## 2020-07-07 ENCOUNTER — HOSPITAL ENCOUNTER (OUTPATIENT)
Dept: LAB | Facility: MEDICAL CENTER | Age: 40
End: 2020-07-07
Attending: INTERNAL MEDICINE
Payer: COMMERCIAL

## 2020-07-07 DIAGNOSIS — Z00.00 PREVENTATIVE HEALTH CARE: Chronic | ICD-10-CM

## 2020-07-07 LAB
ALBUMIN SERPL BCP-MCNC: 4.6 G/DL (ref 3.2–4.9)
ALBUMIN/GLOB SERPL: 1.6 G/DL
ALP SERPL-CCNC: 61 U/L (ref 30–99)
ALT SERPL-CCNC: 34 U/L (ref 2–50)
ANION GAP SERPL CALC-SCNC: 11 MMOL/L (ref 7–16)
AST SERPL-CCNC: 42 U/L (ref 12–45)
BASOPHILS # BLD AUTO: 1.2 % (ref 0–1.8)
BASOPHILS # BLD: 0.09 K/UL (ref 0–0.12)
BILIRUB SERPL-MCNC: 0.9 MG/DL (ref 0.1–1.5)
BUN SERPL-MCNC: 10 MG/DL (ref 8–22)
CALCIUM SERPL-MCNC: 9.5 MG/DL (ref 8.4–10.2)
CHLORIDE SERPL-SCNC: 96 MMOL/L (ref 96–112)
CHOLEST SERPL-MCNC: 197 MG/DL (ref 100–199)
CO2 SERPL-SCNC: 26 MMOL/L (ref 20–33)
CREAT SERPL-MCNC: 0.94 MG/DL (ref 0.5–1.4)
EOSINOPHIL # BLD AUTO: 0.15 K/UL (ref 0–0.51)
EOSINOPHIL NFR BLD: 1.9 % (ref 0–6.9)
ERYTHROCYTE [DISTWIDTH] IN BLOOD BY AUTOMATED COUNT: 40.3 FL (ref 35.9–50)
EST. AVERAGE GLUCOSE BLD GHB EST-MCNC: 111 MG/DL
FASTING STATUS PATIENT QL REPORTED: NORMAL
GLOBULIN SER CALC-MCNC: 2.9 G/DL (ref 1.9–3.5)
GLUCOSE SERPL-MCNC: 87 MG/DL (ref 65–99)
HBA1C MFR BLD: 5.5 % (ref 0–5.6)
HCT VFR BLD AUTO: 45.3 % (ref 42–52)
HDLC SERPL-MCNC: 79 MG/DL
HGB BLD-MCNC: 15.3 G/DL (ref 14–18)
IMM GRANULOCYTES # BLD AUTO: 0.02 K/UL (ref 0–0.11)
IMM GRANULOCYTES NFR BLD AUTO: 0.3 % (ref 0–0.9)
LDLC SERPL CALC-MCNC: 102 MG/DL
LYMPHOCYTES # BLD AUTO: 2.98 K/UL (ref 1–4.8)
LYMPHOCYTES NFR BLD: 38.5 % (ref 22–41)
MCH RBC QN AUTO: 32.2 PG (ref 27–33)
MCHC RBC AUTO-ENTMCNC: 33.8 G/DL (ref 33.7–35.3)
MCV RBC AUTO: 95.4 FL (ref 81.4–97.8)
MONOCYTES # BLD AUTO: 0.81 K/UL (ref 0–0.85)
MONOCYTES NFR BLD AUTO: 10.5 % (ref 0–13.4)
NEUTROPHILS # BLD AUTO: 3.7 K/UL (ref 1.82–7.42)
NEUTROPHILS NFR BLD: 47.6 % (ref 44–72)
NRBC # BLD AUTO: 0 K/UL
NRBC BLD-RTO: 0 /100 WBC
PLATELET # BLD AUTO: 209 K/UL (ref 164–446)
PMV BLD AUTO: 9.5 FL (ref 9–12.9)
POTASSIUM SERPL-SCNC: 4 MMOL/L (ref 3.6–5.5)
PROT SERPL-MCNC: 7.5 G/DL (ref 6–8.2)
RBC # BLD AUTO: 4.75 M/UL (ref 4.7–6.1)
SODIUM SERPL-SCNC: 133 MMOL/L (ref 135–145)
TRIGL SERPL-MCNC: 82 MG/DL (ref 0–149)
TSH SERPL DL<=0.005 MIU/L-ACNC: 1.96 UIU/ML (ref 0.38–5.33)
WBC # BLD AUTO: 7.8 K/UL (ref 4.8–10.8)

## 2020-07-07 PROCEDURE — 87389 HIV-1 AG W/HIV-1&-2 AB AG IA: CPT

## 2020-07-07 PROCEDURE — 80053 COMPREHEN METABOLIC PANEL: CPT

## 2020-07-07 PROCEDURE — 36415 COLL VENOUS BLD VENIPUNCTURE: CPT

## 2020-07-07 PROCEDURE — 82306 VITAMIN D 25 HYDROXY: CPT

## 2020-07-07 PROCEDURE — 85025 COMPLETE CBC W/AUTO DIFF WBC: CPT

## 2020-07-07 PROCEDURE — 83036 HEMOGLOBIN GLYCOSYLATED A1C: CPT

## 2020-07-07 PROCEDURE — 84443 ASSAY THYROID STIM HORMONE: CPT

## 2020-07-07 PROCEDURE — 80061 LIPID PANEL: CPT

## 2020-07-08 ENCOUNTER — TELEPHONE (OUTPATIENT)
Dept: MEDICAL GROUP | Facility: MEDICAL CENTER | Age: 40
End: 2020-07-08

## 2020-07-08 LAB
25(OH)D3 SERPL-MCNC: 41 NG/ML (ref 30–100)
HIV 1+2 AB+HIV1 P24 AG SERPL QL IA: NORMAL

## 2021-02-17 RX ORDER — TADALAFIL 20 MG/1
20 TABLET ORAL PRN
Qty: 10 TABLET | Refills: 3 | Status: SHIPPED | OUTPATIENT
Start: 2021-02-17 | End: 2021-04-06

## 2021-02-17 NOTE — TELEPHONE ENCOUNTER
Cialis    Received request via: Patient    Was the patient seen in the last year in this department? Yes 3/9/2020    Does the patient have an active prescription (recently filled or refills available) for medication(s) requested? No

## 2021-03-30 ENCOUNTER — TELEPHONE (OUTPATIENT)
Dept: MEDICAL GROUP | Facility: MEDICAL CENTER | Age: 41
End: 2021-03-30

## 2021-03-30 NOTE — TELEPHONE ENCOUNTER
ESTABLISHED PATIENT PRE-VISIT PLANNING     Patient was NOT contacted to complete PVP.  ----------------------------------------------------------------------------------------------------    1.  Reviewed notes from the last few office visits within the medical group: Yes    2.  If any orders were placed at last visit or intended to be done for this visit (i.e. 6 mos follow-up), do we have Results/Consult Notes?        •  Labs - Labs ordered, completed on 07/07/2020 and results are in chart.       •  Imaging - Imaging was not ordered at last office visit.       •  Referrals - No referrals were ordered at last office visit.    3. Is this appointment scheduled as a Hospital Follow-Up? No    4.  Immunizations were updated in Epic using Reconcile Outside Information activity? Yes. Immunizations reconciled through Web-IZ and outside sources. Immunization records are up to date.     5.  Patient is due for the following Health Maintenance Topics:   Health Maintenance Due   Topic Date Due   • IMM INFLUENZA (1) Never done       ----------------------------------------------------------------------------------------------------  Pre-Visit Planning note has been created for the Provider and Medical Assistant to review prior to the patient's office appointment. Patient is NOT REQUIRED to follow-up on this note.   Outside information NOT reconciled using the JETME feature. Per Annalise Lee, the JETME feature is down as of 02/09/2021 at 2:00pm. Will reconcile outside information at a later date.

## 2021-04-06 ENCOUNTER — OFFICE VISIT (OUTPATIENT)
Dept: MEDICAL GROUP | Facility: MEDICAL CENTER | Age: 41
End: 2021-04-06
Payer: COMMERCIAL

## 2021-04-06 VITALS
HEART RATE: 65 BPM | OXYGEN SATURATION: 98 % | TEMPERATURE: 97.7 F | WEIGHT: 168 LBS | BODY MASS INDEX: 22.75 KG/M2 | HEIGHT: 72 IN | DIASTOLIC BLOOD PRESSURE: 62 MMHG | SYSTOLIC BLOOD PRESSURE: 116 MMHG

## 2021-04-06 DIAGNOSIS — Z00.00 PREVENTATIVE HEALTH CARE: ICD-10-CM

## 2021-04-06 DIAGNOSIS — N52.9 ERECTILE DYSFUNCTION, UNSPECIFIED ERECTILE DYSFUNCTION TYPE: ICD-10-CM

## 2021-04-06 PROCEDURE — 99213 OFFICE O/P EST LOW 20 MIN: CPT | Performed by: INTERNAL MEDICINE

## 2021-04-06 RX ORDER — SILDENAFIL CITRATE 20 MG/1
TABLET ORAL
Qty: 15 TABLET | Refills: 5 | Status: SHIPPED | OUTPATIENT
Start: 2021-04-06 | End: 2021-08-12 | Stop reason: SDUPTHER

## 2021-04-06 RX ORDER — SILDENAFIL CITRATE 20 MG/1
TABLET ORAL
Qty: 15 TABLET | Refills: 5 | Status: SHIPPED | OUTPATIENT
Start: 2021-04-06 | End: 2021-04-06 | Stop reason: SDUPTHER

## 2021-04-06 ASSESSMENT — FIBROSIS 4 INDEX: FIB4 SCORE: 1.45

## 2021-04-06 ASSESSMENT — PATIENT HEALTH QUESTIONNAIRE - PHQ9: CLINICAL INTERPRETATION OF PHQ2 SCORE: 0

## 2021-04-06 NOTE — PROGRESS NOTES
Subjective:      Santo Tavares is a 40 y.o. male who presents with medication review          HPI     Patient is here to discuss Cialis, he discovered a new prescription of Cialis 10 mg which has been successful for him previously for erectile dysfunction.  In the past taking a full dose would be effective but he would describe some leg cramps or stiffness with activity.  That would be less noticeable at a lower dose and it would alleviate his erectile dysfunction symptoms.  Recently he has been in a new relationship since January, but has had erectile dysfunction and the Cialis has not been effective, even with the new prescription.  He has stopped drinking alcohol for 76 days, last year with the Covid shutdown he has been drinking more alcohol up to half pint a day, but with this new relationship he has made a decision to not drink, and is doing well with regards to exercising consistently, skiing, going to the gym, eating a nutritious diet, trying to get appropriate sleep.  Work stress has been reasonable as he works in the [employer] system.  His family is doing well in [place], his dad did come down with Covid but has recovered and the patient is planning on visiting his family in [place] in July.  Currently denies anxiety, no depression, occasional marijuana use 4 times a week, no other illicit drugs.  Is doing fine with his new relationship although the erectile dysfunction does give him some anxiety since the Cialis did not work.  He has not tried any other medication for erectile dysfunction.  Medications, allergies, medical history, surgical history, social history, family history reviewed and updated  Has gotten the covid vaccine since he works at the [employer]          Current Outpatient Medications   Medication Sig Dispense Refill   • tadalafil (CIALIS) 20 MG tablet Take 1 tablet by mouth as needed for Erectile Dysfunction (take one tab prn at least one hour prior to activity, max one  per 72 hours). 10 tablet 3   • aluminum chloride (DRYSOL) 20 % external solution Apply to affected area every evening 1 Bottle 6   • aluminum chloride (DRYSOL) 20 % external solution APPLY TO AFFECTED AREA(S) EVERY EVENING 35 mL 1   • sumatriptan (IMITREX) 100 MG tablet Take 1 Tab by mouth Once PRN for Migraine (max one per day) for up to 1 dose. 9 Tab 5     No current facility-administered medications for this visit.     History of dysthymia  3/24/17 PHQ 9 declines medication, referral to behavioral health made  3/20/18 PHQ 9 declines medication, referral made again to behavior health  4/13/18 behavioral health note    Hyperhidrosis  3/24/17 on drysol per podiatrist   3/20/18 failed drysol refer to dermatology for hyperhidrosis, botox evaluation  6/7/18 drysol dab o matic  3/9/20 drysol prescription difficult to find at local pharmacy    IBS  Trial align  8/7/14 stool studies and labs negative, improved with less etoh. consider GI evaluation if symptoms recur    Migraine     Preventative health  3/9/17 hep c antibody negative  3/9/17 hep b sAb positive  4/3/18 tdap  7/8/20 hiv negative  7/8/20 vit d 41  7/8/20 chol 197,trig 82,hdl 79,ldl 102             Patient Active Problem List   Diagnosis   • Irritable bowel syndrome   • Migraine   • Preventative health care   • Erectile dysfunction   • Hyperhidrosis   • History of dysthymia   • Etoh     Depression Screening    Little interest or pleasure in doing things?  0 - not at all  Feeling down, depressed , or hopeless? 0 - not at all  Patient Health Questionnaire Score: 0        Health Maintenance Summary                COVID-19 Vaccine Overdue 4/1/2021      Done 3/11/2021 Imm Admin: Pfizer SARS-CoV-2 Vaccine    IMM DTaP/Tdap/Td Vaccine Next Due 4/3/2028      Done 4/3/2018 Imm Admin: Tdap Vaccine          Patient Care Team:  Rd Rueda M.D. as PCP - General    ROS       Objective:          Physical Exam  Vitals and nursing note reviewed.   Constitutional:        Appearance: Normal appearance.   HENT:      Head: Normocephalic and atraumatic.      Right Ear: External ear normal.   Eyes:      Conjunctiva/sclera: Conjunctivae normal.   Cardiovascular:      Rate and Rhythm: Normal rate and regular rhythm.      Heart sounds: Normal heart sounds. No murmur.   Pulmonary:      Effort: No respiratory distress.      Breath sounds: Normal breath sounds.   Abdominal:      General: There is no distension.   Musculoskeletal:      Cervical back: Neck supple.   Skin:     General: Skin is warm.   Neurological:      Mental Status: He is alert.   Psychiatric:         Mood and Affect: Mood normal.         Behavior: Behavior normal.                 Assessment/Plan:        Assessment  #1 erectile dysfunction with performance anxiety component, previously had responded to Cialis, but has not been achieving adequate response with his current relationship, no anxiety or depression    #2  History EtOH, has been off alcohol for 76 days, occasional recreational cannabis usage    #3 patient has had his Covid vaccine series    Plan  #1 trial of Viagra generic sildenafil 20 mg tablets take between 20 mg to 100 mg total, 1 hour prior to activity, medication typically will last up to 4 hours, can cause headache, lightheadedness, advised him to check with the pharmacy first regarding cost    #2 continue no alcohol    #3 work on decreasing cannabis use    #4 continue with his good exercise and nutrition program, consider working on meditation, relaxation therapy    #5 labs preventative ordered, will notify with results

## 2021-04-07 ENCOUNTER — TELEPHONE (OUTPATIENT)
Dept: MEDICAL GROUP | Facility: MEDICAL CENTER | Age: 41
End: 2021-04-07

## 2021-04-07 NOTE — TELEPHONE ENCOUNTER
Thank you, please notify the patient that the insurance would not cover the sildenafil (Viagra generic), thus he will need to pay out-of-pocket.

## 2021-04-07 NOTE — TELEPHONE ENCOUNTER
Need PAR please:  (1) sildenafil citrate 20 mg tabs take one to five tabs po prn one hour prior to activity, maximum five per day  (2) #30 per 30 days  (3) diagnosis: erectile dysfunction  (4) ICD 10: N52.9  (5) has tried and failed cialis

## 2021-04-07 NOTE — TELEPHONE ENCOUNTER
"Denied. States \"This request does not meet criteria for coverage. Sildenafil 20mg is the generic of Revatio which is only indicated for WHO Group I pulmonary hypertension. Criteria for coverage requires the followin) Patient has diagnosis of PAH; 2) Confirmation of PAH confirmed by right heart catheterization or echocardiogram; 3)Confirmation of pulmonary arterial hypertension (PAH) (WHO Group 1); 4) Prescriber is a specialist such as a pulmonologist or cardiologist\"  "

## 2021-07-21 ENCOUNTER — TELEPHONE (OUTPATIENT)
Dept: MEDICAL GROUP | Facility: MEDICAL CENTER | Age: 41
End: 2021-07-21

## 2021-07-21 DIAGNOSIS — Z00.00 PREVENTATIVE HEALTH CARE: ICD-10-CM

## 2021-07-22 NOTE — TELEPHONE ENCOUNTER
----- Message from Iqra Dobbs, Med Ass't sent at 7/21/2021  7:35 AM PDT -----  Regarding: FW: Non-Urgent Medical Question  Contact: [phone]  Please add HIV lab test  ----- Message -----  From: Santo Tavares  Sent: 7/20/2021   4:18 PM PDT  To: Daniela Lopez  Subject: Non-Urgent Medical Question                      Hello.  Following a visit, Dr. Rueda ordered preventative labs for me.  I had not scheduled an appointment for blood work until today and the appointment is for 8/10.  When scheduling the appointment, I selected 'adult draw/collection'.  Did I select the correct choice for the blood work?  Will they know what I need?  Also, will this blood work include an HIV test which I normally request with my annual blood work.  Thank you.

## 2021-08-10 ENCOUNTER — TELEPHONE (OUTPATIENT)
Dept: MEDICAL GROUP | Facility: MEDICAL CENTER | Age: 41
End: 2021-08-10

## 2021-08-10 ENCOUNTER — HOSPITAL ENCOUNTER (OUTPATIENT)
Dept: LAB | Facility: MEDICAL CENTER | Age: 41
End: 2021-08-10
Attending: INTERNAL MEDICINE
Payer: COMMERCIAL

## 2021-08-10 DIAGNOSIS — Z00.00 PREVENTATIVE HEALTH CARE: ICD-10-CM

## 2021-08-10 DIAGNOSIS — Z20.828 EXPOSURE TO HERPES SIMPLEX VIRUS (HSV): ICD-10-CM

## 2021-08-10 LAB
25(OH)D3 SERPL-MCNC: 34 NG/ML (ref 30–100)
ALBUMIN SERPL BCP-MCNC: 4.4 G/DL (ref 3.2–4.9)
ALBUMIN/GLOB SERPL: 1.5 G/DL
ALP SERPL-CCNC: 70 U/L (ref 30–99)
ALT SERPL-CCNC: 23 U/L (ref 2–50)
ANION GAP SERPL CALC-SCNC: 10 MMOL/L (ref 7–16)
AST SERPL-CCNC: 27 U/L (ref 12–45)
BASOPHILS # BLD AUTO: 1.3 % (ref 0–1.8)
BASOPHILS # BLD: 0.09 K/UL (ref 0–0.12)
BILIRUB SERPL-MCNC: 0.5 MG/DL (ref 0.1–1.5)
BUN SERPL-MCNC: 12 MG/DL (ref 8–22)
CALCIUM SERPL-MCNC: 9.6 MG/DL (ref 8.5–10.5)
CHLORIDE SERPL-SCNC: 105 MMOL/L (ref 96–112)
CHOLEST SERPL-MCNC: 162 MG/DL (ref 100–199)
CO2 SERPL-SCNC: 26 MMOL/L (ref 20–33)
CREAT SERPL-MCNC: 0.99 MG/DL (ref 0.5–1.4)
EOSINOPHIL # BLD AUTO: 0.26 K/UL (ref 0–0.51)
EOSINOPHIL NFR BLD: 3.6 % (ref 0–6.9)
ERYTHROCYTE [DISTWIDTH] IN BLOOD BY AUTOMATED COUNT: 39.9 FL (ref 35.9–50)
EST. AVERAGE GLUCOSE BLD GHB EST-MCNC: 108 MG/DL
FASTING STATUS PATIENT QL REPORTED: NORMAL
GLOBULIN SER CALC-MCNC: 2.9 G/DL (ref 1.9–3.5)
GLUCOSE SERPL-MCNC: 99 MG/DL (ref 65–99)
HBA1C MFR BLD: 5.4 % (ref 4–5.6)
HCT VFR BLD AUTO: 46.4 % (ref 42–52)
HDLC SERPL-MCNC: 52 MG/DL
HGB BLD-MCNC: 15.4 G/DL (ref 14–18)
HIV 1+2 AB+HIV1 P24 AG SERPL QL IA: NORMAL
IMM GRANULOCYTES # BLD AUTO: 0.02 K/UL (ref 0–0.11)
IMM GRANULOCYTES NFR BLD AUTO: 0.3 % (ref 0–0.9)
LDLC SERPL CALC-MCNC: 95 MG/DL
LYMPHOCYTES # BLD AUTO: 2.92 K/UL (ref 1–4.8)
LYMPHOCYTES NFR BLD: 41 % (ref 22–41)
MCH RBC QN AUTO: 30.3 PG (ref 27–33)
MCHC RBC AUTO-ENTMCNC: 33.2 G/DL (ref 33.7–35.3)
MCV RBC AUTO: 91.2 FL (ref 81.4–97.8)
MONOCYTES # BLD AUTO: 0.58 K/UL (ref 0–0.85)
MONOCYTES NFR BLD AUTO: 8.1 % (ref 0–13.4)
NEUTROPHILS # BLD AUTO: 3.26 K/UL (ref 1.82–7.42)
NEUTROPHILS NFR BLD: 45.7 % (ref 44–72)
NRBC # BLD AUTO: 0 K/UL
NRBC BLD-RTO: 0 /100 WBC
PLATELET # BLD AUTO: 257 K/UL (ref 164–446)
PMV BLD AUTO: 10.7 FL (ref 9–12.9)
POTASSIUM SERPL-SCNC: 4 MMOL/L (ref 3.6–5.5)
PROT SERPL-MCNC: 7.3 G/DL (ref 6–8.2)
RBC # BLD AUTO: 5.09 M/UL (ref 4.7–6.1)
SODIUM SERPL-SCNC: 141 MMOL/L (ref 135–145)
TRIGL SERPL-MCNC: 74 MG/DL (ref 0–149)
TSH SERPL DL<=0.005 MIU/L-ACNC: 2.52 UIU/ML (ref 0.38–5.33)
WBC # BLD AUTO: 7.1 K/UL (ref 4.8–10.8)

## 2021-08-10 PROCEDURE — 85025 COMPLETE CBC W/AUTO DIFF WBC: CPT

## 2021-08-10 PROCEDURE — 83036 HEMOGLOBIN GLYCOSYLATED A1C: CPT

## 2021-08-10 PROCEDURE — 87389 HIV-1 AG W/HIV-1&-2 AB AG IA: CPT

## 2021-08-10 PROCEDURE — 84443 ASSAY THYROID STIM HORMONE: CPT

## 2021-08-10 PROCEDURE — 82306 VITAMIN D 25 HYDROXY: CPT

## 2021-08-10 PROCEDURE — 80061 LIPID PANEL: CPT

## 2021-08-10 PROCEDURE — 80053 COMPREHEN METABOLIC PANEL: CPT

## 2021-08-10 PROCEDURE — 36415 COLL VENOUS BLD VENIPUNCTURE: CPT

## 2021-08-10 NOTE — TELEPHONE ENCOUNTER
Called the patient and left a message, please notify the patient that his labs show:  (1) normal blood sugar, liver function, and kidney function testing  (2) cholesterol is excellent at 162 (decreased from 197 last year), his HDL or good cholesterol is 52 (goal is above 40), his bad cholesterol is 95 (goal is less than 100), no medications are necessary, have him continue to optimize his nutrition and exercise program  (3) his thyroid level is normal, his vitamin D level is normal, his HIV test is negative  (4) we can repeat his labs in 1 year

## 2021-08-10 NOTE — TELEPHONE ENCOUNTER
----- Message from Sulma Wells R.N. sent at 8/10/2021  7:30 AM PDT -----  Regarding: FW: Non-Urgent Medical Question  Contact: [phone]    ----- Message -----  From: Santo Tavares  Sent: 8/10/2021   6:33 AM PDT  To: Daniela Pelaez Betina Lopez  Subject: RE: Non-Urgent Medical Question                  Dr. Rueda,    Would it also be possible to get tested for HSV 1 and 2 with this draw?    Santo    ----- Message -----  From: Physician Rd Rueda  Sent: 7/21/21 6:00 PM  To: Santo Tavares  Subject: RE: Non-Urgent Medical Question    Thank you so much for the notification.  The orders for the standard blood tests are already in the computer system.  I did not include the HIV test for those additional orders.  I will place the additional HIV test in the computer system.      When you have the blood test done at the [facility], please remind the technician that there are now 2 separate orders in the computer system, the most recent one for the HIV blood test, and the initial April blood test orders.  Sometimes they will only see the most initial test order, but not the previous orders.    Rd          ----- Message -----       From:Santo Tavares       Sent:7/20/2021  4:18 PM PDT         To:Physician Rd Rueda    Subject:Non-Urgent Medical Question    Hello.  Following a visit, Dr. Rueda ordered preventative labs for me.  I had not scheduled an appointment for blood work until today and the appointment is for 8/10.  When scheduling the appointment, I selected 'adult draw/collection'.  Did I select the correct choice for the blood work?  Will they know what I need?  Also, will this blood work include an HIV test which I normally request with my annual blood work.  Thank you.

## 2021-08-10 NOTE — TELEPHONE ENCOUNTER
Called [facility], spoke to Liliane. It looks like those labs done will get sent somewhere else. Pt will have to come in to get the HSV done as there needs to be a certain amount collected.

## 2021-08-10 NOTE — TELEPHONE ENCOUNTER
----- Message from Rd Rueda M.D. sent at 8/10/2021  3:40 PM PDT -----  Called the patient and left a message, please notify the patient that his labs show:  (1) normal blood sugar, liver function, and kidney function testing  (2) cholesterol is excellent at 162 (decreased from 197 last year), his HDL or good cholesterol is 52 (goal is above 40), his bad cholesterol is 95 (goal is less than 100), no medications are necessary, have him continue to optimize his nutrition and exercise program  (3) his thyroid level is normal, his vitamin D level is normal, his HIV test is negative  (4) we can repeat his labs in 1 year

## 2021-08-10 NOTE — TELEPHONE ENCOUNTER
Please call the [facility] lab (it was drawn at [facility]) so I am not sure if you need to call [facility] or the main lab, and see if they can add on the HSV test he is requesting to the blood that was drawn earlier today.    The order is in the computer system.

## 2021-08-12 ENCOUNTER — TELEPHONE (OUTPATIENT)
Dept: MEDICAL GROUP | Facility: MEDICAL CENTER | Age: 41
End: 2021-08-12

## 2021-08-12 RX ORDER — SILDENAFIL CITRATE 20 MG/1
TABLET ORAL
Qty: 15 TABLET | Refills: 5 | Status: SHIPPED | OUTPATIENT
Start: 2021-08-12 | End: 2022-10-29

## 2021-08-13 NOTE — TELEPHONE ENCOUNTER
----- Message from Iqra Dobbs, Med Ass't sent at 8/12/2021  8:05 AM PDT -----  Regarding: FW: Non-Urgent Medical Question  Contact: [phone]    ----- Message -----  From: Santo Tavares  Sent: 8/12/2021   8:01 AM PDT  To: Iqra Dobbs Med Ass't  Subject: RE: Non-Urgent Medical Question                  Yes, I have a refill or 2 left on the script. I had it filled at [facility] but i won't be able to get there this week so would like to change to [facility].

## 2021-09-07 NOTE — PROCEDURE: ADDITIONAL NOTES
Additional Notes: Labs WNL.  Feet only.  Some anxiety and stress with work.  No family bx.  X4 years.  Dry soil helpful at first, but not any longer.
Additional Notes: Pt given information about Iontophoresis.  Will f/u with Botox injection referral.  Cont Drysol under occlusion qhs.  Powder.  Moist wicking socks.
1.4

## 2021-09-15 ENCOUNTER — TELEPHONE (OUTPATIENT)
Dept: MEDICAL GROUP | Facility: MEDICAL CENTER | Age: 41
End: 2021-09-15

## 2021-09-15 RX ORDER — TADALAFIL 20 MG/1
20 TABLET ORAL PRN
Qty: 10 TABLET | Refills: 3 | Status: SHIPPED | OUTPATIENT
Start: 2021-09-15 | End: 2021-09-16 | Stop reason: SDUPTHER

## 2021-09-16 ENCOUNTER — TELEPHONE (OUTPATIENT)
Dept: MEDICAL GROUP | Facility: MEDICAL CENTER | Age: 41
End: 2021-09-16

## 2021-09-16 RX ORDER — TADALAFIL 20 MG/1
20 TABLET ORAL PRN
Qty: 10 TABLET | Refills: 3 | Status: SHIPPED | OUTPATIENT
Start: 2021-09-16 | End: 2022-06-13 | Stop reason: SDUPTHER

## 2021-09-16 NOTE — TELEPHONE ENCOUNTER
----- Message from Kizzy Quevedo, Med Ass't sent at 9/15/2021  9:58 AM PDT -----  Regarding: FW: ED problem    ----- Message -----  From: Santo Tavares  Sent: 9/15/2021   9:44 AM PDT  To: Daniela Lopez  Subject: ED problem                                       Thanks Dr. Rueda. I would like to go with the cialis 20 mg again, the 72 hour window seems like the best fit. I'm still abstaining from alcohol so no problem there and relatively low stress elsewhere in my life. Do you think anything else like meditation or talking to someone would help? And one hindrance with cialis is the cost. My insurance will not cover this. Is there a generic version or any other way to reduce the cost? Thank you Dr. Rueda.

## 2021-10-27 DIAGNOSIS — Z00.00 PREVENTATIVE HEALTH CARE: ICD-10-CM

## 2021-10-27 DIAGNOSIS — N52.9 ERECTILE DYSFUNCTION, UNSPECIFIED ERECTILE DYSFUNCTION TYPE: ICD-10-CM

## 2021-11-05 ENCOUNTER — APPOINTMENT (OUTPATIENT)
Dept: LAB | Facility: MEDICAL CENTER | Age: 41
End: 2021-11-05
Attending: INTERNAL MEDICINE
Payer: COMMERCIAL

## 2021-12-08 ENCOUNTER — HOSPITAL ENCOUNTER (OUTPATIENT)
Dept: LAB | Facility: MEDICAL CENTER | Age: 41
End: 2021-12-08
Attending: INTERNAL MEDICINE
Payer: COMMERCIAL

## 2021-12-08 DIAGNOSIS — N52.9 ERECTILE DYSFUNCTION, UNSPECIFIED ERECTILE DYSFUNCTION TYPE: ICD-10-CM

## 2021-12-08 DIAGNOSIS — Z20.828 EXPOSURE TO HERPES SIMPLEX VIRUS (HSV): ICD-10-CM

## 2021-12-08 DIAGNOSIS — Z00.00 PREVENTATIVE HEALTH CARE: ICD-10-CM

## 2021-12-08 LAB — TESTOST SERPL-MCNC: 925 NG/DL (ref 175–781)

## 2021-12-08 PROCEDURE — 86695 HERPES SIMPLEX TYPE 1 TEST: CPT

## 2021-12-08 PROCEDURE — 84403 ASSAY OF TOTAL TESTOSTERONE: CPT

## 2021-12-08 PROCEDURE — 84402 ASSAY OF FREE TESTOSTERONE: CPT

## 2021-12-08 PROCEDURE — 86694 HERPES SIMPLEX NES ANTBDY: CPT

## 2021-12-08 PROCEDURE — 36415 COLL VENOUS BLD VENIPUNCTURE: CPT

## 2021-12-08 PROCEDURE — 86696 HERPES SIMPLEX TYPE 2 TEST: CPT

## 2021-12-11 LAB — TESTOST FREE SERPL-MCNC: 115 PG/ML (ref 47–244)

## 2021-12-12 LAB
HSV1 GG IGG SER-ACNC: 37.2 IV
HSV1+2 IGG SER IA-ACNC: >22.4 IV
HSV2 GG IGG SER-ACNC: 0.07 IV

## 2022-03-24 ENCOUNTER — TELEPHONE (OUTPATIENT)
Dept: HEALTH INFORMATION MANAGEMENT | Facility: OTHER | Age: 42
End: 2022-03-24

## 2022-03-24 DIAGNOSIS — R45.86 MOOD DISTURBANCE: ICD-10-CM

## 2022-03-24 NOTE — TELEPHONE ENCOUNTER
1. Caller Name: Santo Tavares                        Call Back Number: [phone]      How would the patient prefer to be contacted with a response: [email or web address] message    2. SPECIFIC Action To Be Taken: Referral pending, please sign.    3. Diagnosis/Clinical Reason for Request: Depression     4. Specialty & Provider Name/Lab/Imaging Location: Behavioral Health     5. Is appointment scheduled for requested order/referral: no    Patient was informed they will receive a return phone call from the office ONLY if there are any questions before processing their request. Advised to call back if they haven't received a call from the referral department in 5 days.

## 2022-06-13 ENCOUNTER — TELEPHONE (OUTPATIENT)
Dept: MEDICAL GROUP | Facility: MEDICAL CENTER | Age: 42
End: 2022-06-13
Payer: COMMERCIAL

## 2022-06-13 RX ORDER — TADALAFIL 20 MG/1
20 TABLET ORAL PRN
Qty: 10 TABLET | Refills: 0 | Status: SHIPPED | OUTPATIENT
Start: 2022-06-13 | End: 2022-10-26 | Stop reason: SDUPTHER

## 2022-06-13 NOTE — TELEPHONE ENCOUNTER
Please notify the patient he was last seen over a year ago, he is due for his follow-up appointment.

## 2022-07-06 SDOH — ECONOMIC STABILITY: INCOME INSECURITY: IN THE LAST 12 MONTHS, WAS THERE A TIME WHEN YOU WERE NOT ABLE TO PAY THE MORTGAGE OR RENT ON TIME?: NO

## 2022-07-06 SDOH — ECONOMIC STABILITY: HOUSING INSECURITY
IN THE LAST 12 MONTHS, WAS THERE A TIME WHEN YOU DID NOT HAVE A STEADY PLACE TO SLEEP OR SLEPT IN A SHELTER (INCLUDING NOW)?: NO

## 2022-07-06 SDOH — HEALTH STABILITY: PHYSICAL HEALTH: ON AVERAGE, HOW MANY DAYS PER WEEK DO YOU ENGAGE IN MODERATE TO STRENUOUS EXERCISE (LIKE A BRISK WALK)?: 6 DAYS

## 2022-07-06 SDOH — ECONOMIC STABILITY: TRANSPORTATION INSECURITY
IN THE PAST 12 MONTHS, HAS THE LACK OF TRANSPORTATION KEPT YOU FROM MEDICAL APPOINTMENTS OR FROM GETTING MEDICATIONS?: NO

## 2022-07-06 SDOH — ECONOMIC STABILITY: HOUSING INSECURITY: IN THE LAST 12 MONTHS, HOW MANY PLACES HAVE YOU LIVED?: 1

## 2022-07-06 SDOH — ECONOMIC STABILITY: FOOD INSECURITY: WITHIN THE PAST 12 MONTHS, YOU WORRIED THAT YOUR FOOD WOULD RUN OUT BEFORE YOU GOT MONEY TO BUY MORE.: NEVER TRUE

## 2022-07-06 SDOH — HEALTH STABILITY: MENTAL HEALTH
STRESS IS WHEN SOMEONE FEELS TENSE, NERVOUS, ANXIOUS, OR CAN'T SLEEP AT NIGHT BECAUSE THEIR MIND IS TROUBLED. HOW STRESSED ARE YOU?: RATHER MUCH

## 2022-07-06 SDOH — ECONOMIC STABILITY: FOOD INSECURITY: WITHIN THE PAST 12 MONTHS, THE FOOD YOU BOUGHT JUST DIDN'T LAST AND YOU DIDN'T HAVE MONEY TO GET MORE.: NEVER TRUE

## 2022-07-06 SDOH — ECONOMIC STABILITY: INCOME INSECURITY: HOW HARD IS IT FOR YOU TO PAY FOR THE VERY BASICS LIKE FOOD, HOUSING, MEDICAL CARE, AND HEATING?: NOT HARD AT ALL

## 2022-07-06 SDOH — ECONOMIC STABILITY: TRANSPORTATION INSECURITY
IN THE PAST 12 MONTHS, HAS LACK OF RELIABLE TRANSPORTATION KEPT YOU FROM MEDICAL APPOINTMENTS, MEETINGS, WORK OR FROM GETTING THINGS NEEDED FOR DAILY LIVING?: NO

## 2022-07-06 SDOH — HEALTH STABILITY: PHYSICAL HEALTH: ON AVERAGE, HOW MANY MINUTES DO YOU ENGAGE IN EXERCISE AT THIS LEVEL?: 40 MIN

## 2022-07-06 SDOH — ECONOMIC STABILITY: TRANSPORTATION INSECURITY
IN THE PAST 12 MONTHS, HAS LACK OF TRANSPORTATION KEPT YOU FROM MEETINGS, WORK, OR FROM GETTING THINGS NEEDED FOR DAILY LIVING?: NO

## 2022-07-06 ASSESSMENT — SOCIAL DETERMINANTS OF HEALTH (SDOH)
HOW OFTEN DO YOU ATTEND CHURCH OR RELIGIOUS SERVICES?: NEVER
IN A TYPICAL WEEK, HOW MANY TIMES DO YOU TALK ON THE PHONE WITH FAMILY, FRIENDS, OR NEIGHBORS?: MORE THAN THREE TIMES A WEEK
HOW OFTEN DO YOU ATTENT MEETINGS OF THE CLUB OR ORGANIZATION YOU BELONG TO?: PATIENT DECLINED
HOW HARD IS IT FOR YOU TO PAY FOR THE VERY BASICS LIKE FOOD, HOUSING, MEDICAL CARE, AND HEATING?: NOT HARD AT ALL
HOW OFTEN DO YOU ATTENT MEETINGS OF THE CLUB OR ORGANIZATION YOU BELONG TO?: PATIENT DECLINED
HOW OFTEN DO YOU ATTEND CHURCH OR RELIGIOUS SERVICES?: NEVER
IN A TYPICAL WEEK, HOW MANY TIMES DO YOU TALK ON THE PHONE WITH FAMILY, FRIENDS, OR NEIGHBORS?: MORE THAN THREE TIMES A WEEK
HOW OFTEN DO YOU HAVE SIX OR MORE DRINKS ON ONE OCCASION: NEVER
DO YOU BELONG TO ANY CLUBS OR ORGANIZATIONS SUCH AS CHURCH GROUPS UNIONS, FRATERNAL OR ATHLETIC GROUPS, OR SCHOOL GROUPS?: NO
HOW OFTEN DO YOU GET TOGETHER WITH FRIENDS OR RELATIVES?: ONCE A WEEK
HOW MANY DRINKS CONTAINING ALCOHOL DO YOU HAVE ON A TYPICAL DAY WHEN YOU ARE DRINKING: PATIENT DOES NOT DRINK
ARE YOU MARRIED, WIDOWED, DIVORCED, SEPARATED, NEVER MARRIED, OR LIVING WITH A PARTNER?: NEVER MARRIED
HOW OFTEN DO YOU GET TOGETHER WITH FRIENDS OR RELATIVES?: ONCE A WEEK
WITHIN THE PAST 12 MONTHS, YOU WORRIED THAT YOUR FOOD WOULD RUN OUT BEFORE YOU GOT THE MONEY TO BUY MORE: NEVER TRUE
ARE YOU MARRIED, WIDOWED, DIVORCED, SEPARATED, NEVER MARRIED, OR LIVING WITH A PARTNER?: NEVER MARRIED
DO YOU BELONG TO ANY CLUBS OR ORGANIZATIONS SUCH AS CHURCH GROUPS UNIONS, FRATERNAL OR ATHLETIC GROUPS, OR SCHOOL GROUPS?: NO
HOW OFTEN DO YOU HAVE A DRINK CONTAINING ALCOHOL: NEVER

## 2022-07-06 ASSESSMENT — LIFESTYLE VARIABLES
HOW OFTEN DO YOU HAVE A DRINK CONTAINING ALCOHOL: NEVER
HOW MANY STANDARD DRINKS CONTAINING ALCOHOL DO YOU HAVE ON A TYPICAL DAY: PATIENT DOES NOT DRINK
AUDIT-C TOTAL SCORE: 0
SKIP TO QUESTIONS 9-10: 1
HOW OFTEN DO YOU HAVE SIX OR MORE DRINKS ON ONE OCCASION: NEVER

## 2022-07-07 ENCOUNTER — OFFICE VISIT (OUTPATIENT)
Dept: MEDICAL GROUP | Facility: MEDICAL CENTER | Age: 42
End: 2022-07-07
Payer: COMMERCIAL

## 2022-07-07 VITALS
WEIGHT: 165 LBS | HEIGHT: 72 IN | OXYGEN SATURATION: 98 % | SYSTOLIC BLOOD PRESSURE: 116 MMHG | DIASTOLIC BLOOD PRESSURE: 70 MMHG | BODY MASS INDEX: 22.35 KG/M2 | TEMPERATURE: 98.2 F | HEART RATE: 51 BPM

## 2022-07-07 DIAGNOSIS — Z86.59 HISTORY OF DYSTHYMIA: ICD-10-CM

## 2022-07-07 DIAGNOSIS — L72.9 SCROTAL CYST: ICD-10-CM

## 2022-07-07 DIAGNOSIS — Z86.16 HISTORY OF COVID-19: ICD-10-CM

## 2022-07-07 DIAGNOSIS — F10.20 ETOHISM (HCC): ICD-10-CM

## 2022-07-07 DIAGNOSIS — R45.86 MOOD DISTURBANCE: ICD-10-CM

## 2022-07-07 DIAGNOSIS — Z00.00 PREVENTATIVE HEALTH CARE: ICD-10-CM

## 2022-07-07 PROCEDURE — 99396 PREV VISIT EST AGE 40-64: CPT | Performed by: INTERNAL MEDICINE

## 2022-07-07 ASSESSMENT — FIBROSIS 4 INDEX: FIB4 SCORE: 0.94

## 2022-07-07 ASSESSMENT — PATIENT HEALTH QUESTIONNAIRE - PHQ9: CLINICAL INTERPRETATION OF PHQ2 SCORE: 0

## 2022-07-07 NOTE — PROGRESS NOTES
Subjective     Santo Tavares is a 41 y.o. male who presents annual           HPI    Here for annual  Exercise 5-6 days per week, does go to the gym and cross fit workouts  No regular nsaids   Drinks water, no soda, eats a healthy diet  Eye exam annually  Had took a year off from etoh started drinking again in January and drinking rapidly escalated to the point where he thought he needed help, seeing therapist mary george through employee assistance program and has been off alcohol since March, still has occasional anxiety but is doing well off alcohol.  Sees his therapist regularly.  Erectile dysfunction stable on Cialis.  No side effects with the medication.  Tested positive for COVID in May.  No post COVID shortness of breath, no smell or taste changes, does have occasional twitching of his eye at times that is new since COVID.  Occasional scrotal discomfort at times with activity, previous ultrasound 20 years ago showed a small epididymal cyst.  Medications, allergies, medical history, surgical history, social history, family history  reviewed and updated          Current Outpatient Medications   Medication Sig Dispense Refill   • tadalafil (CIALIS) 20 MG tablet Take 1 Tablet by mouth as needed for Erectile Dysfunction (take one po q 72 hours prior to activity). 10 Tablet 0   • sildenafil (REVATIO) 20 MG tablet Take between 1 to 5 tabs po one hour prior to activity 15 Tablet 5   • sumatriptan (IMITREX) 100 MG tablet Take 1 Tab by mouth Once PRN for Migraine (max one per day) for up to 1 dose. 9 Tab 5     No current facility-administered medications for this visit.     History of dysthymia  3/24/17 PHQ 9 declines medication, referral to behavioral health made  3/20/18 PHQ 9 declines medication, referral made again to behavior health  4/13/18 behavioral health note     Hyperhidrosis  3/24/17 on drysol per podiatrist   3/20/18 failed drysol refer to dermatology for hyperhidrosis, botox evaluation  6/7/18  drysol dab o matic  3/9/20 drysol prescription difficult to find at local pharmacy     IBS  Trial align  8/7/14 stool studies and labs negative, improved with less etoh. consider GI evaluation if symptoms recur     Migraine     Preventative health  3/9/17 hep c antibody negative  3/9/17 hep b sAb positive  4/3/18 tdap  8/10/21 chol 162,trig 74,hdl 52,ldl 95  8/10/21 A1c 5.4%  8/10/21 vit d 34  8/10/21 hiv negative    Patient Active Problem List   Diagnosis   • Irritable bowel syndrome   • Migraine   • Preventative health care   • Erectile dysfunction   • Hyperhidrosis   • History of dysthymia   • Etoh     Depression Screening  Little interest or pleasure in doing things?  0 - not at all  Feeling down, depressed , or hopeless? 0 - not at all  Patient Health Questionnaire Score: 0          Health Maintenance Summary          IMM INFLUENZA (1) Next due on 9/1/2022    10/20/2021  Imm Admin: Influenza Vaccine Quad Inj (Pf)    10/28/2020  Imm Admin: Influenza Vaccine Quad Inj (Pf)          IMM DTaP/Tdap/Td Vaccine (2 - Td or Tdap) Next due on 4/3/2028    04/03/2018  Imm Admin: Tdap Vaccine          COVID-19 Vaccine (Series Information) Completed    10/20/2021  Imm Admin: PFIZER PURPLE CAP SARS-COV-2 VACCINATION (12+)    04/01/2021  Imm Admin: PFIZER PURPLE CAP SARS-COV-2 VACCINATION (12+)    03/11/2021  Imm Admin: PFIZER PURPLE CAP SARS-COV-2 VACCINATION (12+)          IMM MENINGOCOCCAL VACCINE (MCV4) (Series Information) Aged Out    No completion history exists for this topic.          IMM PNEUMOCOCCAL VACCINE: 0-64 Years (Series Information) Aged Out    No completion history exists for this topic.          Discontinued - IMM HEP B VACCINE  Discontinued    No completion history exists for this topic.                Patient Care Team:  Rdseble Rueda M.D. as PCP - General    ROS           Objective          Physical Exam  Vitals and nursing note reviewed.   Constitutional:       Appearance: Normal appearance.   HENT:       Head: Normocephalic and atraumatic.   Eyes:      Conjunctiva/sclera: Conjunctivae normal.   Cardiovascular:      Rate and Rhythm: Normal rate and regular rhythm.      Heart sounds: Normal heart sounds.   Pulmonary:      Effort: Pulmonary effort is normal.      Breath sounds: Normal breath sounds.   Abdominal:      General: There is no distension.   Musculoskeletal:         General: No swelling.   Skin:     Coloration: Skin is not jaundiced.   Neurological:      General: No focal deficit present.      Mental Status: He is alert.   Psychiatric:         Mood and Affect: Mood normal.         Behavior: Behavior normal.                 Assessment & Plan        Assessment  #! Annual     #2  History of EtOH, off alcohol since March, sees a therapist in [place] and has been doing well off alcohol    #3  Erectile dysfunction, normal testosterone levels in December, doing well on Cialis as needed, likely a component of anxiety contributing to his symptoms    #3 occasional scrotal discomfort    #4 anxiety improved with seeing a therapist in [place]     #5 history of COVID in May, some eye twitching sequela but otherwise no change in taste or smell    Plan  #!  Labs    #2 scrotal ultrasound    #3 continue no alcohol, follow-up psychotherapy    #4 continue good nutrition and exercise    #5 continue Cialis as needed, offered 5 mg daily dose but for now he will continue with 20 mg as needed    #6 follow-up with his eye doctor if twitching persists consider referral for Botox    #7 we will call with lab results and ultrasound results, follow-up 1 year

## 2022-08-15 ENCOUNTER — TELEPHONE (OUTPATIENT)
Dept: MEDICAL GROUP | Facility: MEDICAL CENTER | Age: 42
End: 2022-08-15
Payer: COMMERCIAL

## 2022-08-15 ENCOUNTER — HOSPITAL ENCOUNTER (OUTPATIENT)
Dept: RADIOLOGY | Facility: MEDICAL CENTER | Age: 42
End: 2022-08-15
Attending: INTERNAL MEDICINE
Payer: COMMERCIAL

## 2022-08-15 DIAGNOSIS — L72.9 SCROTAL CYST: ICD-10-CM

## 2022-08-15 PROBLEM — N50.3 EPIDIDYMAL CYST: Status: ACTIVE | Noted: 2022-08-15

## 2022-08-15 PROCEDURE — 76870 US EXAM SCROTUM: CPT

## 2022-09-09 ENCOUNTER — HOSPITAL ENCOUNTER (OUTPATIENT)
Dept: LAB | Facility: MEDICAL CENTER | Age: 42
End: 2022-09-09
Attending: INTERNAL MEDICINE
Payer: COMMERCIAL

## 2022-09-09 ENCOUNTER — TELEPHONE (OUTPATIENT)
Dept: MEDICAL GROUP | Facility: MEDICAL CENTER | Age: 42
End: 2022-09-09
Payer: COMMERCIAL

## 2022-09-09 DIAGNOSIS — Z00.00 PREVENTATIVE HEALTH CARE: ICD-10-CM

## 2022-09-09 LAB
25(OH)D3 SERPL-MCNC: 32 NG/ML (ref 30–100)
ALBUMIN SERPL BCP-MCNC: 4.7 G/DL (ref 3.2–4.9)
ALBUMIN/GLOB SERPL: 1.9 G/DL
ALP SERPL-CCNC: 58 U/L (ref 30–99)
ALT SERPL-CCNC: 17 U/L (ref 2–50)
ANION GAP SERPL CALC-SCNC: 9 MMOL/L (ref 7–16)
AST SERPL-CCNC: 27 U/L (ref 12–45)
BASOPHILS # BLD AUTO: 0.8 % (ref 0–1.8)
BASOPHILS # BLD: 0.05 K/UL (ref 0–0.12)
BILIRUB SERPL-MCNC: 0.8 MG/DL (ref 0.1–1.5)
BUN SERPL-MCNC: 9 MG/DL (ref 8–22)
CALCIUM SERPL-MCNC: 9 MG/DL (ref 8.5–10.5)
CHLORIDE SERPL-SCNC: 103 MMOL/L (ref 96–112)
CHOLEST SERPL-MCNC: 214 MG/DL (ref 100–199)
CO2 SERPL-SCNC: 25 MMOL/L (ref 20–33)
CREAT SERPL-MCNC: 0.89 MG/DL (ref 0.5–1.4)
EOSINOPHIL # BLD AUTO: 0.29 K/UL (ref 0–0.51)
EOSINOPHIL NFR BLD: 4.4 % (ref 0–6.9)
ERYTHROCYTE [DISTWIDTH] IN BLOOD BY AUTOMATED COUNT: 41.9 FL (ref 35.9–50)
EST. AVERAGE GLUCOSE BLD GHB EST-MCNC: 111 MG/DL
FASTING STATUS PATIENT QL REPORTED: NORMAL
GFR SERPLBLD CREATININE-BSD FMLA CKD-EPI: 108 ML/MIN/1.73 M 2
GLOBULIN SER CALC-MCNC: 2.5 G/DL (ref 1.9–3.5)
GLUCOSE SERPL-MCNC: 83 MG/DL (ref 65–99)
HBA1C MFR BLD: 5.5 % (ref 4–5.6)
HCT VFR BLD AUTO: 45.9 % (ref 42–52)
HDLC SERPL-MCNC: 65 MG/DL
HGB BLD-MCNC: 15.8 G/DL (ref 14–18)
IMM GRANULOCYTES # BLD AUTO: 0.02 K/UL (ref 0–0.11)
IMM GRANULOCYTES NFR BLD AUTO: 0.3 % (ref 0–0.9)
LDLC SERPL CALC-MCNC: 101 MG/DL
LYMPHOCYTES # BLD AUTO: 3 K/UL (ref 1–4.8)
LYMPHOCYTES NFR BLD: 45 % (ref 22–41)
MCH RBC QN AUTO: 31.7 PG (ref 27–33)
MCHC RBC AUTO-ENTMCNC: 34.4 G/DL (ref 33.7–35.3)
MCV RBC AUTO: 92.2 FL (ref 81.4–97.8)
MONOCYTES # BLD AUTO: 0.54 K/UL (ref 0–0.85)
MONOCYTES NFR BLD AUTO: 8.1 % (ref 0–13.4)
NEUTROPHILS # BLD AUTO: 2.76 K/UL (ref 1.82–7.42)
NEUTROPHILS NFR BLD: 41.4 % (ref 44–72)
NRBC # BLD AUTO: 0 K/UL
NRBC BLD-RTO: 0 /100 WBC
PLATELET # BLD AUTO: 203 K/UL (ref 164–446)
PMV BLD AUTO: 9.7 FL (ref 9–12.9)
POTASSIUM SERPL-SCNC: 3.5 MMOL/L (ref 3.6–5.5)
PROT SERPL-MCNC: 7.2 G/DL (ref 6–8.2)
RBC # BLD AUTO: 4.98 M/UL (ref 4.7–6.1)
SODIUM SERPL-SCNC: 137 MMOL/L (ref 135–145)
TRIGL SERPL-MCNC: 241 MG/DL (ref 0–149)
TSH SERPL DL<=0.005 MIU/L-ACNC: 1.7 UIU/ML (ref 0.38–5.33)
WBC # BLD AUTO: 6.7 K/UL (ref 4.8–10.8)

## 2022-09-09 PROCEDURE — 83036 HEMOGLOBIN GLYCOSYLATED A1C: CPT

## 2022-09-09 PROCEDURE — 84443 ASSAY THYROID STIM HORMONE: CPT

## 2022-09-09 PROCEDURE — 80061 LIPID PANEL: CPT

## 2022-09-09 PROCEDURE — 36415 COLL VENOUS BLD VENIPUNCTURE: CPT

## 2022-09-09 PROCEDURE — 82306 VITAMIN D 25 HYDROXY: CPT

## 2022-09-09 PROCEDURE — 80053 COMPREHEN METABOLIC PANEL: CPT

## 2022-09-09 PROCEDURE — 85025 COMPLETE CBC W/AUTO DIFF WBC: CPT

## 2022-09-10 NOTE — TELEPHONE ENCOUNTER
Notified with labs, triglycerides slightly high or above his normal baseline although he has been eating more takeout food, sugar is normal, no diabetes.  Potassium minimally decreased, advised him to cut back on the takeout food, keep well-hydrated, continue to work on his nutrition and exercise program and continue work on good sleep habits.  We can repeat his labs in 1 year.

## 2022-10-29 RX ORDER — TADALAFIL 20 MG/1
TABLET ORAL
Qty: 10 TABLET | Refills: 11 | Status: SHIPPED | OUTPATIENT
Start: 2022-10-29 | End: 2022-10-29 | Stop reason: SDUPTHER

## 2022-10-29 RX ORDER — TADALAFIL 20 MG/1
TABLET ORAL
Qty: 10 TABLET | Refills: 11 | Status: SHIPPED | OUTPATIENT
Start: 2022-10-29 | End: 2023-09-21 | Stop reason: SDUPTHER

## 2023-04-13 ENCOUNTER — TELEPHONE (OUTPATIENT)
Dept: MEDICAL GROUP | Facility: MEDICAL CENTER | Age: 43
End: 2023-04-13

## 2023-04-13 ENCOUNTER — OFFICE VISIT (OUTPATIENT)
Dept: MEDICAL GROUP | Facility: MEDICAL CENTER | Age: 43
End: 2023-04-13
Payer: COMMERCIAL

## 2023-04-13 VITALS
BODY MASS INDEX: 22.35 KG/M2 | RESPIRATION RATE: 16 BRPM | WEIGHT: 165 LBS | DIASTOLIC BLOOD PRESSURE: 70 MMHG | TEMPERATURE: 98.3 F | SYSTOLIC BLOOD PRESSURE: 114 MMHG | HEART RATE: 61 BPM | OXYGEN SATURATION: 97 % | HEIGHT: 72 IN

## 2023-04-13 DIAGNOSIS — F10.20 ETOHISM (HCC): ICD-10-CM

## 2023-04-13 DIAGNOSIS — Z00.00 PREVENTATIVE HEALTH CARE: ICD-10-CM

## 2023-04-13 DIAGNOSIS — G24.5 EYE TWITCH: ICD-10-CM

## 2023-04-13 DIAGNOSIS — Z86.59 HISTORY OF DYSTHYMIA: ICD-10-CM

## 2023-04-13 DIAGNOSIS — H02.9 EYELID LESION: ICD-10-CM

## 2023-04-13 PROCEDURE — 99214 OFFICE O/P EST MOD 30 MIN: CPT | Performed by: INTERNAL MEDICINE

## 2023-04-13 ASSESSMENT — PATIENT HEALTH QUESTIONNAIRE - PHQ9
CLINICAL INTERPRETATION OF PHQ2 SCORE: 1
SUM OF ALL RESPONSES TO PHQ QUESTIONS 1-9: 2
5. POOR APPETITE OR OVEREATING: 0 - NOT AT ALL

## 2023-04-13 ASSESSMENT — FIBROSIS 4 INDEX: FIB4 SCORE: 1.42

## 2023-04-13 NOTE — LETTER
[facility]  Rd Rueda M.D.  [address]  [place] [address]  Fax: [phone]   Authorization for Release/Disclosure of   Protected Health Information   Name: KD FRAGA : 1980 SSN: [id number]   Address: [address] [place] [id number] Phone:    [phone] (home)    I authorize the entity listed below to release/disclose the PHI below to:   [facility]/Rd Rueda M.D. and Rd Rueda M.D.   Provider or Entity Name:                                                   Address   City, [facility]   Phone:      Fax:     Reason for request: continuity of care   Information to be released: OLD RECORDS   [  ] LAST COLONOSCOPY,  including any PATH REPORT and follow-up  [  ] LAST FIT/COLOGUARD RESULT [  ] LAST DEXA  [  ] LAST MAMMOGRAM  [  ] LAST PAP  [  ] LAST LABS [  ] RETINA EXAM REPORT  [  ] IMMUNIZATION RECORDS  [ x ] Release all info      [  ] Check here and initial the line next to each item to release ALL health information INCLUDING  _____ Care and treatment for drug and / or alcohol abuse  _____ HIV testing, infection status, or AIDS  _____ Genetic Testing    DATES OF SERVICE OR TIME PERIOD TO BE DISCLOSED: _____________  I understand and acknowledge that:  * This Authorization may be revoked at any time by you in writing, except if your health information has already been used or disclosed.  * Your health information that will be used or disclosed as a result of you signing this authorization could be re-disclosed by the recipient. If this occurs, your re-disclosed health information may no longer be protected by State or Federal laws.  * You may refuse to sign this Authorization. Your refusal will not affect your ability to obtain treatment.  * This Authorization becomes effective upon signing and will  on (date) __________.      If no date is indicated, this Authorization will  one (1) year from the signature  date.    Name: Santo Tavares  Signature:                   Continuity of Care   Date:   4/18/2023     PLEASE FAX REQUESTED RECORDS BACK TO: [phone]

## 2023-04-13 NOTE — PROGRESS NOTES
Subjective     Santo Tavares is a 42 y.o. male who presents with Follow-Up (Bump on lower lid of L eye/twitch )            HPI           No current alcohol, had been sober for a year until last February when he started to drink again and after a few months he realized that he could not drink again and by Apri decided to stop drinking, and had been doing well off alcohol until last fall when he started drinking again, he then realized that he should not drink and has been seeing his therapist and has had no alcohol since last fall.  He continues with the smart alcohol program online which takes more of a psychological approach to alcohol compared to AA which he has been to before and he finds the Smart program more effective for him.  He has a good relationship with his girlfriend who only drinks casually, and he feels much better when he does not drink.  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  He has noticed left eyelid lower prominence for a few months, no pain, no drainage or discharge, no history recurrent stye infections, saw his optometrist at [facility] today and was told he might have a stye, to use cool compresses.  Also has eye twitching periodically, can occur every hour, no clear relationship to stress, lack of sleep, caffeine intake, not related to migraine headaches, not related to bright light exposure.  Keeping active, exercising regularly go skiing, eating a healthy diet      Current Outpatient Medications:     tadalafil (CIALIS) 20 MG tablet, Take on po q 72 hours as needed before activity., Disp: 10 Tablet, Rfl: 11    sumatriptan (IMITREX) 100 MG tablet, Take 1 Tab by mouth Once PRN for Migraine (max one per day) for up to 1 dose., Disp: 9 Tab, Rfl: 5          history covid  5/22 covid positive      History of dysthymia  3/24/17 PHQ 9 declines medication, referral to behavioral health made  3/20/18 PHQ 9  declines medication, referral made again to behavior health  4/13/18 behavioral health note  7/7/22 sees psychologist in [place]    history etoh   3/20/18 drinking 3-6 peers per day  7/7/22 off etoh since march and sees psychology in [place] m[facility]     Hyperhidrosis  3/24/17 on drysol per podiatrist   3/20/18 failed drysol refer to dermatology for hyperhidrosis, botox evaluation  6/7/18 drysol dab o matic  3/9/20 drysol prescription difficult to find at local pharmacy     IBS  Trial align  8/7/14 stool studies and labs negative, improved with less etoh. consider GI evaluation if symptoms recur     Migraine     Preventative health  3/9/17 hep c antibody negative  3/9/17 hep b sAb positive  4/3/18 tdap  8/10/21 chol 162,trig 74,hdl 52,ldl 95  8/10/21 hiv negative  9/9/22 vit d 32  9/9/22 A1c 5.5%  9/9/22 chol 214,trig 241,hdl 65,ldl 101    Patient Active Problem List   Diagnosis    Irritable bowel syndrome    Migraine    Preventative health care    Erectile dysfunction    Hyperhidrosis    History of dysthymia    History of etoh    History of COVID-19    Epididymal cyst     Depression Screening  Little interest or pleasure in doing things?  0 - not at all  Feeling down, depressed , or hopeless? 1 - several days  Trouble falling or staying asleep, or sleeping too much?  0 - not at all  Feeling tired or having little energy?  0 - not at all  Poor appetite or overeating?  0 - not at all  Feeling bad about yourself - or that you are a failure or have let yourself or your family down? 1 - several days  Trouble concentrating on things, such as reading the newspaper or watching television? 0 - not at all  Moving or speaking so slowly that other people could have noticed.  Or the opposite - being so fidgety or restless that you have been moving around a lot more than usual?  0 - not at all  Thoughts that you would be better off dead, or of hurting yourself?  0 - not at all  Patient Health Questionnaire Score: 2      "            Patient Care Team:  Rd Rueda M.D. as PCP - General      ROS           Objective     /70 (BP Location: Right arm, Patient Position: Sitting, BP Cuff Size: Adult)   Pulse 61   Temp 36.8 °C (98.3 °F)   Resp 16   Ht 1.822 m (5' 11.75\")   Wt 74.8 kg (165 lb)   SpO2 97%   BMI 22.53 kg/m²      Physical Exam  Vitals and nursing note reviewed.   Constitutional:       Appearance: Normal appearance.   HENT:      Head: Normocephalic and atraumatic.      Right Ear: External ear normal.      Left Ear: External ear normal.   Eyes:      Conjunctiva/sclera: Conjunctivae normal.   Cardiovascular:      Rate and Rhythm: Normal rate and regular rhythm.      Heart sounds: Normal heart sounds.   Pulmonary:      Effort: Pulmonary effort is normal.      Breath sounds: Normal breath sounds.   Abdominal:      General: There is no distension.   Musculoskeletal:         General: No swelling.   Skin:     Findings: No bruising.   Neurological:      General: No focal deficit present.      Mental Status: He is alert.      Gait: Gait normal.   Psychiatric:         Mood and Affect: Mood normal.         Behavior: Behavior normal.     Left lower eyelid prominence question cyst, no induration, no redness, no periorbital edema, extraocular movements intact  No eye twitching or blepharospasm             Assessment & Plan        Assessment  #!  Left lower eyelid question cyst, no evidence of infection, no induration, no erythema    #2 left eye twitching periodically occurring on a regular basis, no obvious relationship to anxiety, stress, caffeine intake, no visual changes, saw his optometrist today, no history of hyperthyroid state    #3 history EtOH, currently maintaining sobriety, sees a psychologist and involved in Smart treatment program      Plan  #1  Referral to  oculoplastics    #2 obtain old records from optometry    #3 continue no alcohol, continue follow-up with his therapist, minimize caffeine, work on a " good nutrition, exercise, and sleep program    #4 Labs ordered to evaluate for overactive thyroid, also preventative labs ordered

## 2023-07-13 ENCOUNTER — OFFICE VISIT (OUTPATIENT)
Dept: MEDICAL GROUP | Facility: MEDICAL CENTER | Age: 43
End: 2023-07-13
Payer: COMMERCIAL

## 2023-07-13 VITALS
DIASTOLIC BLOOD PRESSURE: 62 MMHG | HEIGHT: 72 IN | WEIGHT: 163 LBS | HEART RATE: 58 BPM | TEMPERATURE: 98.1 F | RESPIRATION RATE: 16 BRPM | SYSTOLIC BLOOD PRESSURE: 104 MMHG | BODY MASS INDEX: 22.08 KG/M2 | OXYGEN SATURATION: 99 %

## 2023-07-13 DIAGNOSIS — Z00.00 PREVENTATIVE HEALTH CARE: ICD-10-CM

## 2023-07-13 DIAGNOSIS — B00.9 HSV INFECTION: ICD-10-CM

## 2023-07-13 PROCEDURE — 3074F SYST BP LT 130 MM HG: CPT | Performed by: INTERNAL MEDICINE

## 2023-07-13 PROCEDURE — 99396 PREV VISIT EST AGE 40-64: CPT | Performed by: INTERNAL MEDICINE

## 2023-07-13 PROCEDURE — 3078F DIAST BP <80 MM HG: CPT | Performed by: INTERNAL MEDICINE

## 2023-07-13 RX ORDER — VALACYCLOVIR HYDROCHLORIDE 500 MG/1
500 TABLET, FILM COATED ORAL DAILY
Qty: 90 TABLET | Refills: 3 | Status: SHIPPED | OUTPATIENT
Start: 2023-07-13

## 2023-07-13 ASSESSMENT — FIBROSIS 4 INDEX: FIB4 SCORE: 1.42

## 2023-07-13 NOTE — PROGRESS NOTES
Subjective     Santo Tavares is a 42 y.o. male who presents with annual         HPI      Here for annual exam  He just returned from a trip to [place] where he had a fantastic vacation, did backcountry skiing, and spent some time with his girlfriend sightseeing.  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 No joint aches or pains with activity or exercise, no regular NSAIDs. Nutrition has been good for the most part since returning from his trip. Keeps hydrating well with water, uses sunscreen outdoors.  No etoh   Eye exam annually  Dental exam twice per year and does brush and floss   Work is stable, mood stable sees therapist once per month and has been doing a very good job keeping of alcohol  He has an appointment with ophthalmology plastic surgery this fall for a stye  He has had occasional outbreaks of cold sores  Medications, allergies, medical history, surgical history, social history, family history  reviewed and updated    Current Outpatient Medications   Medication Sig Dispense Refill    tadalafil (CIALIS) 20 MG tablet Take on po q 72 hours as needed before activity. 10 Tablet 11    sumatriptan (IMITREX) 100 MG tablet Take 1 Tab by mouth Once PRN for Migraine (max one per day) for up to 1 dose. 9 Tab 5     No current facility-administered medications for this visit.       epididymal cyst  8/15/22 ultrasound scrotum no evidence of torsion or mass, small left epididymal cyst not significant      history covid  5/22 covid positive      History of dysthymia  3/24/17 PHQ 9 declines medication, referral to behavioral health made  3/20/18 PHQ 9 declines medication, referral made again to behavior health  4/13/18 behavioral health note  7/7/22 sees psychologist in [place]      Hyperhidrosis  3/24/17 on drysol per podiatrist   3/20/18 failed drysol refer to dermatology for hyperhidrosis, botox  evaluation  6/7/18 drysol dab o matic  3/9/20 drysol prescription difficult to find at local pharmacy     IBS  Trial align  8/7/14 stool studies and labs negative, improved with less etoh. consider GI evaluation if symptoms recur     Migraine     Preventative health  3/9/17 hep c antibody negative  3/9/17 hep b sAb positive  4/3/18 tdap  8/10/21 chol 162,trig 74,hdl 52,ldl 95  8/10/21 hiv negative  9/9/22 vit d 32  9/9/22 A1c 5.5%  9/9/22 chol 214,trig 241,hdl 65,ldl 101    Patient Active Problem List   Diagnosis    Irritable bowel syndrome    Migraine    Preventative health care    Erectile dysfunction    Hyperhidrosis    History of dysthymia    History of etoh    History of COVID-19    Epididymal cyst          Patient Care Team:  Rd Rueda M.D. as PCP - General      ROS           Objective          Physical Exam  Vitals and nursing note reviewed.   Constitutional:       Appearance: Normal appearance.   HENT:      Head: Normocephalic and atraumatic.      Right Ear: External ear normal.      Left Ear: External ear normal.   Eyes:      Conjunctiva/sclera: Conjunctivae normal.   Cardiovascular:      Rate and Rhythm: Normal rate and regular rhythm.      Heart sounds: Normal heart sounds.   Pulmonary:      Effort: Pulmonary effort is normal.      Breath sounds: Normal breath sounds.   Abdominal:      General: There is no distension.   Musculoskeletal:         General: No swelling.   Skin:     Findings: No bruising.   Neurological:      General: No focal deficit present.      Mental Status: He is alert.   Psychiatric:         Mood and Affect: Mood normal.         Behavior: Behavior normal.                   Assessment & Plan        Assessment  #1  Annual exam    #2 history of episodic cold sores    #3 history of dysthymia, off alcohol, sees his therapist once a month    #4 preventative health will be due for colon cancer screening in September         Plan  #1 Labs preventative ordered    #2 continue with his good  nutrition and exercise    #3 continue off alcohol, follow-up with his therapist monthly    #4 continue sunscreen and lip balm    #5 Valtrex 500 mg daily for oral labial HSV suppression    #6 contact me in September after his birthday at the end of August to let me know when he is ready for the colon cancer screening referral    #7 follow-up 1 year

## 2023-09-14 ENCOUNTER — PATIENT MESSAGE (OUTPATIENT)
Dept: MEDICAL GROUP | Facility: MEDICAL CENTER | Age: 43
End: 2023-09-14
Payer: COMMERCIAL

## 2023-09-14 DIAGNOSIS — Z12.11 COLON CANCER SCREENING: ICD-10-CM

## 2024-01-27 DIAGNOSIS — G43.809 OTHER MIGRAINE WITHOUT STATUS MIGRAINOSUS, NOT INTRACTABLE: ICD-10-CM

## 2024-01-27 RX ORDER — SUMATRIPTAN 100 MG/1
100 TABLET, FILM COATED ORAL
Qty: 9 TABLET | Refills: 5 | Status: SHIPPED | OUTPATIENT
Start: 2024-01-27

## 2024-03-04 ENCOUNTER — TELEMEDICINE (OUTPATIENT)
Dept: MEDICAL GROUP | Facility: MEDICAL CENTER | Age: 44
End: 2024-03-04
Payer: COMMERCIAL

## 2024-03-04 VITALS — TEMPERATURE: 98.6 F | HEIGHT: 72 IN | WEIGHT: 165 LBS | BODY MASS INDEX: 22.35 KG/M2

## 2024-03-04 DIAGNOSIS — F10.20 ETOHISM (HCC): ICD-10-CM

## 2024-03-04 DIAGNOSIS — Z00.00 PREVENTATIVE HEALTH CARE: ICD-10-CM

## 2024-03-04 DIAGNOSIS — F10.288 ALCOHOL DEPENDENCE WITH OTHER ALCOHOL-INDUCED DISORDER (HCC): ICD-10-CM

## 2024-03-04 PROCEDURE — 99214 OFFICE O/P EST MOD 30 MIN: CPT | Mod: 95 | Performed by: INTERNAL MEDICINE

## 2024-03-04 ASSESSMENT — FIBROSIS 4 INDEX: FIB4 SCORE: 1.45

## 2024-03-04 ASSESSMENT — PATIENT HEALTH QUESTIONNAIRE - PHQ9
SUM OF ALL RESPONSES TO PHQ QUESTIONS 1-9: 10
CLINICAL INTERPRETATION OF PHQ2 SCORE: 4
5. POOR APPETITE OR OVEREATING: 1 - SEVERAL DAYS

## 2024-03-04 NOTE — PROGRESS NOTES
Virtual Visit: Established Patient   This visit was conducted via Zoom using secure and encrypted videoconferencing technology.   The patient was in their home in the [facility].    The patient's identity was confirmed and verbal consent was obtained for this virtual visit.     Subjective:   CC: Alcohol dependence  Chief Complaint   Patient presents with    Follow-Up       Santo Tavares is a 43 y.o. male presenting for evaluation and management of:  Alcohol dependence        Telemedicine appointment.  Initially I had difficulty establishing video, but that was established and both the patient and myself had good audio and visual of each other for this telemedicine appointment.  Patient provides me an update, indicating that when I last saw him in July, he had actually been drinking alcohol although at the time of the appointment he indicated that he had not been drinking.  He has a history of alcohol use disorder, and from last July through October of last year he had been binge drinking 1 or 2 weeks at a time 5-6 drinks per night when he was drinking, but in October he drank more heavily and missed days at work.  His support network of friends and partner who lives in [place] advised him to seek help, and he met with a therapist and his work was quite supportive and he entered an outpatient treatment program at the [facility], this was a 10-week intensive treatment program 3 hours 2 days/week, and he was allowed to continue to work modifying his schedule thus allowing him to participate in the intensive outpatient alcohol rehabilitation program.    He did complete the program in February and upon discharge continued to go to smart meetings virtually twice a week, and continue to see his therapist monthly virtually.  Perhaps what may have precipitated the increase alcohol intake in October, was that he had just visited [place] where his partner lives and he had a wonderful time  visiting with her, and also he visited his family in [place] although there may have been some family dynamics during that visit but he returned home having had an overall good trip.  But when he returned he started to drink more heavily in October thus precipitating him seeking help for his alcohol intake.  Upon discharge from the intensive outpatient program, he was doing well initially but relapsed on February 21 of this year, again after returning from a vacation in [facility] where he saw his partner, he did have some urges on that trip but his partner is quite supportive, and he did not drink on the trip, she understands his drinking history and he did well on that trip not drink alcohol at all.  But when he returned from the trip again he started to drink for 10 days of heavy drinking half a pint to a pint daily.  Talking to his sister, friends, and his partner helped make him realize that he needed to quit drinking.  He is going to AA meetings now instead of the smart recovery meetings.  Last drink of alcohol was March 1.  Did have episodes of leg pain right lateral thigh area perhaps related to skin, and also had episodes where he went to sleep and had a dream with strange noises like an electronic harmonica since he quit drinking 3 days ago.  No history of seizures or alcohol withdrawal seizures.  Medications, allergies, medical history, surgical history, social history, family history  reviewed and updated        Current medicines (including changes today)  Current Outpatient Medications   Medication Sig Dispense Refill    sumatriptan (IMITREX) 100 MG tablet Take 1 Tablet by mouth one time as needed for Migraine (max one per day) for up to 1 dose. 9 Tablet 5    tadalafil (CIALIS) 20 MG tablet Take on po q 72 hours as needed before activity. 10 Tablet 11    valACYclovir (VALTREX) 500 MG Tab Take 1 Tablet by mouth every day. Indications: take once a day to suppress cold sores 90 Tablet 3     No  current facility-administered medications for this visit.       Patient Active Problem List    Diagnosis Date Noted    Epididymal cyst 08/15/2022    History of COVID-19 07/07/2022    History of etoh 03/20/2018    Hyperhidrosis 03/24/2017    History of dysthymia 03/24/2017    Erectile dysfunction 01/12/2016    [facility] health care 07/28/2014    Migraine 07/26/2010    Irritable bowel syndrome 09/01/2009    HSV infection 08/31/2009        Objective:   Temp 37 °C (98.6 °F)   Ht 1.829 m (6')   Wt 74.8 kg (165 lb)   BMI 22.38 kg/m²     Physical Exam:   Constitutional: Alert, no distress, well-groomed.  Skin: No rashes in visible areas.  Eye: Round. Conjunctiva clear, lids normal. No icterus.   ENMT: Lips pink without lesions, good dentition, moist mucous membranes. Phonation normal.  Neck: No masses, no thyromegaly. Moves freely without pain.  Respiratory: Unlabored respiratory effort, no cough or audible wheeze  Psych: Alert, pleasant, responds appropriately to questions, no flight of ideas, no hallucinations, no delusions, good insight to situation, judgment appears intact, no suicidal ideation    Assessment and Plan:   The following treatment plan was discussed:        Assessment  #1 alcohol use disorder and dependence, has had periodic episodes of binge drinking, went to the the [facility] outpatient intensive treatment program for 10 weeks completing the course in February although he relapsed later that month.  He has been going to Smart meetings as well as visiting with his therapist, and his major relapses or worsening alcohol issues seem to have occurred after visiting his partner in  [place] and he states they have a wonderful relationship and she is quite supportive with the timing of his relapses or intensive alcohol usage would indicate that he becomes lonely after meeting with her in [place] and that may be a trigger for him.    #2 he is 3 days out from his last drink, has had some vivid  dreams and leg pain but I do not believe these are delirium tremens type symptoms, no history of seizures and by video he does not appear tremulous, anxious, or diaphoretic, perhaps the vivid dreams may be alcoholic hallucinosis which may persist for up to 48 hours after last drink but after 48 hours the concern would be more for delirium tremens which he does not exhibit as he is lucid, is not delirious, does not appear agitated or diaphoretic by video    Plan  #1 encouraged him to continue going to AA meetings, to participate in meetings and find a sponsor    #2 continue working with his therapist regarding alcohol and triggers that may cause him to have cravings for drinking    #3 continue to talk to friends, family, and his partner    #4 recommend starting naltrexone 50 mg daily for a week then increasing to 100 mg daily after that, may cause nausea, lightheadedness, dizziness, he is not on opiate therapy, warned him that he cannot be on opiate therapy and take naltrexone, that could lead to significant side effects such as abdominal pain, diaphoresis, agitation, heart racing or skipping, nausea, vomiting, mental status changes, thus he will not take opiate therapy.  Unrelated to opiate therapy, the naltrexone may cause lightheadedness, decreased appetite, nausea, loose stools, headache    #4 repeat labs ordered we will mail that to him fasting    #5 recommend seeing a psychiatrist, he will check to see if there is a psychiatrist in [place] or where he lives and see if they take his insurance plan if so he will let me know the name and group and we can start the referral process    #6 follow-up with myself 6 weeks in person visit, notify me sooner side effects to the naltrexone or if he has urges to drink alcohol    #7 I congratulated him on wanting to maintain his sobriety, and using the principles of AA, to take things 1 day at a time, if he does not drink today that is a good day but he is at  significant risk for relapse, that is why he needs to go to meetings, find a sponsor, talk to family and friends and avoid isolation, and follow-up with his therapist

## 2024-03-05 RX ORDER — NALTREXONE HYDROCHLORIDE 50 MG/1
100 TABLET, FILM COATED ORAL DAILY
Qty: 60 TABLET | Refills: 2 | Status: SHIPPED | OUTPATIENT
Start: 2024-04-04

## 2024-03-05 RX ORDER — NALTREXONE HYDROCHLORIDE 50 MG/1
TABLET, FILM COATED ORAL
Qty: 53 TABLET | Refills: 0 | Status: SHIPPED | OUTPATIENT
Start: 2024-03-05 | End: 2024-04-04

## 2024-03-26 ENCOUNTER — TELEPHONE (OUTPATIENT)
Dept: MEDICAL GROUP | Facility: MEDICAL CENTER | Age: 44
End: 2024-03-26
Payer: COMMERCIAL

## 2024-03-26 NOTE — TELEPHONE ENCOUNTER
Santo Tavares  [phone] ([place])     Pt called requesting that you submit another referral for [facility] (Mental Health). Needs to be submitted to [facility] @ [phone].

## 2024-03-27 ENCOUNTER — TELEPHONE (OUTPATIENT)
Dept: MEDICAL GROUP | Facility: MEDICAL CENTER | Age: 44
End: 2024-03-27
Payer: COMMERCIAL

## 2024-03-27 DIAGNOSIS — F41.9 ANXIETY: ICD-10-CM

## 2024-04-17 ENCOUNTER — HOSPITAL ENCOUNTER (OUTPATIENT)
Dept: LAB | Facility: MEDICAL CENTER | Age: 44
End: 2024-04-17
Attending: INTERNAL MEDICINE
Payer: COMMERCIAL

## 2024-04-17 DIAGNOSIS — Z00.00 PREVENTATIVE HEALTH CARE: ICD-10-CM

## 2024-04-17 LAB
BASOPHILS # BLD AUTO: 1.4 % (ref 0–1.8)
BASOPHILS # BLD: 0.09 K/UL (ref 0–0.12)
EOSINOPHIL # BLD AUTO: 0.13 K/UL (ref 0–0.51)
EOSINOPHIL NFR BLD: 2.1 % (ref 0–6.9)
ERYTHROCYTE [DISTWIDTH] IN BLOOD BY AUTOMATED COUNT: 42.3 FL (ref 35.9–50)
EST. AVERAGE GLUCOSE BLD GHB EST-MCNC: 108 MG/DL
HBA1C MFR BLD: 5.4 % (ref 4–5.6)
HCT VFR BLD AUTO: 45.6 % (ref 42–52)
HGB BLD-MCNC: 15.3 G/DL (ref 14–18)
IMM GRANULOCYTES # BLD AUTO: 0.01 K/UL (ref 0–0.11)
IMM GRANULOCYTES NFR BLD AUTO: 0.2 % (ref 0–0.9)
LYMPHOCYTES # BLD AUTO: 2.16 K/UL (ref 1–4.8)
LYMPHOCYTES NFR BLD: 34.5 % (ref 22–41)
MCH RBC QN AUTO: 31.2 PG (ref 27–33)
MCHC RBC AUTO-ENTMCNC: 33.6 G/DL (ref 32.3–36.5)
MCV RBC AUTO: 92.9 FL (ref 81.4–97.8)
MONOCYTES # BLD AUTO: 0.55 K/UL (ref 0–0.85)
MONOCYTES NFR BLD AUTO: 8.8 % (ref 0–13.4)
NEUTROPHILS # BLD AUTO: 3.32 K/UL (ref 1.82–7.42)
NEUTROPHILS NFR BLD: 53 % (ref 44–72)
NRBC # BLD AUTO: 0 K/UL
NRBC BLD-RTO: 0 /100 WBC (ref 0–0.2)
PLATELET # BLD AUTO: 278 K/UL (ref 164–446)
PMV BLD AUTO: 10.1 FL (ref 9–12.9)
RBC # BLD AUTO: 4.91 M/UL (ref 4.7–6.1)
WBC # BLD AUTO: 6.3 K/UL (ref 4.8–10.8)

## 2024-04-17 PROCEDURE — 36415 COLL VENOUS BLD VENIPUNCTURE: CPT

## 2024-04-17 PROCEDURE — 83036 HEMOGLOBIN GLYCOSYLATED A1C: CPT

## 2024-04-17 PROCEDURE — 82306 VITAMIN D 25 HYDROXY: CPT

## 2024-04-17 PROCEDURE — 85025 COMPLETE CBC W/AUTO DIFF WBC: CPT

## 2024-04-17 PROCEDURE — 84443 ASSAY THYROID STIM HORMONE: CPT

## 2024-04-17 PROCEDURE — 80053 COMPREHEN METABOLIC PANEL: CPT

## 2024-04-17 PROCEDURE — 80061 LIPID PANEL: CPT

## 2024-04-18 LAB
25(OH)D3 SERPL-MCNC: 26 NG/ML (ref 30–100)
ALBUMIN SERPL BCP-MCNC: 4.7 G/DL (ref 3.2–4.9)
ALBUMIN/GLOB SERPL: 1.8 G/DL
ALP SERPL-CCNC: 57 U/L (ref 30–99)
ALT SERPL-CCNC: 18 U/L (ref 2–50)
ANION GAP SERPL CALC-SCNC: 12 MMOL/L (ref 7–16)
AST SERPL-CCNC: 26 U/L (ref 12–45)
BILIRUB SERPL-MCNC: 0.6 MG/DL (ref 0.1–1.5)
BUN SERPL-MCNC: 10 MG/DL (ref 8–22)
CALCIUM ALBUM COR SERPL-MCNC: 9 MG/DL (ref 8.5–10.5)
CALCIUM SERPL-MCNC: 9.6 MG/DL (ref 8.5–10.5)
CHLORIDE SERPL-SCNC: 100 MMOL/L (ref 96–112)
CHOLEST SERPL-MCNC: 174 MG/DL (ref 100–199)
CO2 SERPL-SCNC: 25 MMOL/L (ref 20–33)
CREAT SERPL-MCNC: 1 MG/DL (ref 0.5–1.4)
GFR SERPLBLD CREATININE-BSD FMLA CKD-EPI: 94 ML/MIN/1.73 M 2
GLOBULIN SER CALC-MCNC: 2.6 G/DL (ref 1.9–3.5)
GLUCOSE SERPL-MCNC: 84 MG/DL (ref 65–99)
HDLC SERPL-MCNC: 59 MG/DL
LDLC SERPL CALC-MCNC: 98 MG/DL
POTASSIUM SERPL-SCNC: 4.7 MMOL/L (ref 3.6–5.5)
PROT SERPL-MCNC: 7.3 G/DL (ref 6–8.2)
SODIUM SERPL-SCNC: 137 MMOL/L (ref 135–145)
TRIGL SERPL-MCNC: 84 MG/DL (ref 0–149)
TSH SERPL DL<=0.005 MIU/L-ACNC: 1.59 UIU/ML (ref 0.38–5.33)

## 2024-04-19 ENCOUNTER — TELEPHONE (OUTPATIENT)
Dept: MEDICAL GROUP | Facility: MEDICAL CENTER | Age: 44
End: 2024-04-19
Payer: COMMERCIAL

## 2024-04-19 PROBLEM — E78.5 DYSLIPIDEMIA: Status: ACTIVE | Noted: 2024-04-19

## 2024-04-19 NOTE — TELEPHONE ENCOUNTER
Notified with labs, cholesterol much improved last year, he is maintaining sobriety, seeing his therapist, [place], and doing well overall.  He has opted to not start naltrexone which is fine since he is doing so well and doing what it takes to maintain sobriety.  Based on his labs continue to his great work maintaining sobriety off alcohol, he can start vitamin D over-the-counter 5000 units daily.

## 2024-10-23 RX ORDER — TADALAFIL 20 MG/1
TABLET ORAL
Qty: 10 TABLET | Refills: 11 | Status: SHIPPED | OUTPATIENT
Start: 2024-10-23 | End: 2024-10-31 | Stop reason: SDUPTHER

## 2024-10-31 RX ORDER — TADALAFIL 20 MG/1
TABLET ORAL
Qty: 10 TABLET | Refills: 11 | Status: SHIPPED | OUTPATIENT
Start: 2024-10-31

## 2024-11-06 NOTE — TELEPHONE ENCOUNTER
----- Message from Kizzy Quevedo, Med Ass't sent at 9/16/2021  2:20 PM PDT -----  Regarding: FW: ED problem    ----- Message -----  From: Santo Tavares  Sent: 9/16/2021  12:22 PM PDT  To: Daniela Lopez  Subject: ED problem                                       Thank you again Dr. Rueda. I'll try the cialis and wait on a therapist referral for now but keep it in mind as a next step. Thank you also for the suggestions for possibly finding a cheaper source, I'll try those. I see the cialis prescription is for [address] [address]. Wpuld it be possible to change it to the [facility]? I rarely use the [facility] on [facility] lately. Thank you again.     New Patient Office Visit    CHIEF COMPLAINT:  No chief complaint on file.       HISTORY OF PRESENT ILLNESS:  Nannette is a 70 year old female, previous patient of Dr. Dai, who presents today to establish care.     Medical history:  - Embolic stroke with residual vestibular/balance issues, ambulates with a walker at baseline  - Nonischemic cardiomyopathy   - Follows with Cardiology (Dr. Patel)  - Hypertension  - Elevated in the office today at 153/85, patient states that at home she typically runs in the 120s to 130s systolic and has a component of whitecoat hypertension  - We have not calibrated in office readings with her pressure cuff to this point  - Prediabetes  - Taking Jardiance following her cardiac episode  - A1c in the office today was 6.2 (last done in March was 6.1)  -Mixed hyperlipidemia, on statin, needs refills today  -Cologuard was ordered last appointment, this was not done  -Inquiring about flu shot today  -Last wellness visit/physical exam was at the end of March 2024       MEDICATIONS:  Current Outpatient Medications   Medication Sig Dispense Refill    nitroGLYCERIN (NITROSTAT) 0.4 MG sublingual tablet Place 0.4 mg under the tongue every 5 minutes as needed for Chest pain.      atorvastatin (LIPITOR) 40 MG tablet Take 1 tablet by mouth every evening. 90 tablet 3    hydroCHLOROthiazide 12.5 MG tablet Take 1 tablet by mouth daily. 90 tablet 3    losartan (COZAAR) 100 MG tablet TAKE 1 TABLET BY MOUTH DAILY 90 tablet 3    Jardiance 10 MG tablet TAKE 1 TABLET BY MOUTH EVERY DAY BEFORE BREAKFAST 90 tablet 3    metoPROLOL succinate (TOPROL-XL) 25 MG 24 hr tablet TAKE 2 TABLETS BY MOUTH DAILY 180 tablet 3    aspirin 81 MG EC tablet Take 1 tablet by mouth daily. Do not start before August 1, 2022. 30 tablet 0    Multiple Vitamin (MULTIVITAMIN ADULT PO) Take 1 tablet by mouth daily.      cholecalciferol (VITAMIN D) 25 mcg (1,000 units) tablet Take 25 mcg by mouth daily.      MAGNESIUM PO Take 1 tablet by  mouth daily.       No current facility-administered medications for this visit.       ALLERGIES:  No Known Allergies     HISTORY:    ALL pertinent labs, imaging, records, documents and consults have been analyzed and reviewed.    Past Medical History:   Diagnosis Date    Essential (primary) hypertension     High cholesterol        Past Surgical History:   Procedure Laterality Date    Varicose vein surgery Left         Immunization History   Administered Date(s) Administered    COVID Moderna 0.5 mL 12Y+ 07/16/2021, 08/13/2021    Influenza, high dose, quadrivalent, PF 12/28/2023   Pended Date(s) Pended    Influenza, high-dose, trivalent, PF 11/06/2024       Family History   Problem Relation Age of Onset    Heart disease Mother     Hypertension Mother     Patient is unaware of any medical problems Father     Pacemaker Sister        REVIEW OF SYSTEMS:  Review of Systems   Constitutional:  Negative for activity change, appetite change, fatigue and fever.   HENT:  Negative for congestion, hearing loss, rhinorrhea and sinus pressure.    Eyes:  Negative for visual disturbance.   Respiratory:  Negative for cough and shortness of breath.    Cardiovascular:  Negative for chest pain.   Gastrointestinal:  Negative for abdominal distention and abdominal pain.   Genitourinary:  Negative for difficulty urinating and vaginal bleeding.   Skin:  Negative for pallor and rash.   Neurological:  Negative for dizziness, light-headedness and headaches.   Psychiatric/Behavioral:  Negative for sleep disturbance. The patient is not nervous/anxious.         PHYSICAL EXAM:  Visit Vitals  BP (!) 153/85   Pulse 62   Temp 98 °F (36.7 °C) (Temporal)   Resp 16   Ht 5' 2\" (1.575 m)   Wt 59.6 kg (131 lb 6.3 oz)   SpO2 98%   BMI 24.03 kg/m²      Physical Exam  Constitutional:       General: She is not in acute distress.     Appearance: Normal appearance.   HENT:      Head: Normocephalic and atraumatic.      Right Ear: External ear normal.      Left Ear:  External ear normal.      Nose: Nose normal. No congestion or rhinorrhea.      Mouth/Throat:      Mouth: Mucous membranes are moist.      Pharynx: Oropharynx is clear.   Eyes:      General: No scleral icterus.     Conjunctiva/sclera: Conjunctivae normal.      Pupils: Pupils are equal, round, and reactive to light.   Cardiovascular:      Rate and Rhythm: Normal rate and regular rhythm.   Pulmonary:      Effort: Pulmonary effort is normal. No respiratory distress.      Breath sounds: Normal breath sounds.   Abdominal:      General: Abdomen is flat.      Palpations: Abdomen is soft.   Musculoskeletal:         General: Normal range of motion.      Cervical back: Normal range of motion.   Skin:     General: Skin is warm and dry.      Coloration: Skin is not jaundiced or pale.   Neurological:      Mental Status: She is alert.      Comments: Residual occasional vestibular symptoms, amides walker at baseline, baseline ophthalmoplegia   Psychiatric:         Mood and Affect: Mood normal.         Behavior: Behavior normal.          ASSESSMENT/PLAN:  1. Encounter to establish care  2. Colon cancer screening  Comments:  Cologuard ordered  3. Nonischemic cardiomyopathy  (CMD)  Comments:  In the setting of COVID-19  Follows with cardiology  4. Coronary artery disease involving native coronary artery of native heart without angina pectoris  5. Balance problem  Comments:  Secondary to stroke history  Ambulates with walker at baseline  Declines PT eval and vestibular therapy order today when offered  6. History of embolic stroke  7. Need for vaccination  Comments:  Flu shot today  8. High cholesterol  Comments:  Refilled statin today  9. Essential (primary) hypertension  10. History of 2019 novel coronavirus disease (COVID-19)       Return in about 4 months (around 3/6/2025) for Wellness visit.     Health Maintenance Summary       Pneumococcal Vaccine 65+ (1 of 2 - PCV)  Never done    DTaP/Tdap/Td Vaccine (1 - Tdap)  Never done     Colorectal Cancer Screen (View Topic Details)  Never done    Shingles Vaccine (1 of 2)  Never done    Osteoporosis Screening (Once)  Never done    Influenza Vaccine (1)  Ordered on 11/6/2024    COVID-19 Vaccine (3 - 2024-25 season)  Overdue since 9/1/2024    Depression Screening (Yearly)  Next due on 11/6/2025    Breast Cancer Screening (Every 2 Years)  Next due on 3/28/2026    Hepatitis C Screening   Completed    Medicare Advantage- Medicare Wellness Visit   Completed    Meningococcal Vaccine   Aged Out    Hepatitis B Vaccine (For Physician/APC Discussion)   Aged Out    HPV Vaccine   Aged Out            Porter Peña,   Family Medicine

## 2025-02-19 ENCOUNTER — TELEPHONE (OUTPATIENT)
Dept: MEDICAL GROUP | Facility: MEDICAL CENTER | Age: 45
End: 2025-02-19
Payer: COMMERCIAL

## 2025-02-19 DIAGNOSIS — Z31.84 ENCOUNTER FOR FERTILITY PRESERVATION PROCEDURE: ICD-10-CM

## 2025-02-19 DIAGNOSIS — Z86.59 HISTORY OF DYSTHYMIA: ICD-10-CM

## 2025-02-21 NOTE — Clinical Note
REFERRAL APPROVAL NOTICE         Sent on February 20, 2025                   Santo Tavares  [address]  [facility] [address]                   Dear Mr. Tavares,    After a careful review of the medical information and benefit coverage, [facility] has processed your referral. See below for additional details.    If applicable, you must be actively enrolled with your insurance for coverage of the authorized service. If you have any questions regarding your coverage, please contact your insurance directly.    REFERRAL INFORMATION   Referral #:  [id number]  Referred-To Provider    Referred-By Provider:  Urology    Rdseble Rueda M.D.   [facility]      [address]   [address]  [facility] [address]  [phone] [address]  [facility] [address]  [phone]    Referral Start Date:  02/19/2025  Referral End Date:   02/19/2026             SCHEDULING  If you do not already have an appointment, please call [phone] to make an appointment.     MORE INFORMATION  If you do not already have a [employer] account, sign up at: [email or web address]  You can access your medical information, make appointments, see lab results, billing information, and more.  If you have questions regarding this referral, please contact  the [facility] Referrals department at:             [phone]. Monday - Friday 8:00AM - 5:00PM.     Sincerely,    [facility]

## 2025-04-01 ENCOUNTER — APPOINTMENT (OUTPATIENT)
Dept: MEDICAL GROUP | Facility: MEDICAL CENTER | Age: 45
End: 2025-04-01
Payer: COMMERCIAL

## 2025-04-01 ENCOUNTER — TELEPHONE (OUTPATIENT)
Dept: MEDICAL GROUP | Facility: MEDICAL CENTER | Age: 45
End: 2025-04-01

## 2025-04-01 VITALS
RESPIRATION RATE: 12 BRPM | HEIGHT: 71 IN | DIASTOLIC BLOOD PRESSURE: 56 MMHG | TEMPERATURE: 98.1 F | HEART RATE: 51 BPM | SYSTOLIC BLOOD PRESSURE: 100 MMHG | OXYGEN SATURATION: 97 % | WEIGHT: 153.4 LBS | BODY MASS INDEX: 21.48 KG/M2

## 2025-04-01 DIAGNOSIS — Z00.00 PREVENTATIVE HEALTH CARE: ICD-10-CM

## 2025-04-01 DIAGNOSIS — Z12.5 PROSTATE CANCER SCREENING: ICD-10-CM

## 2025-04-01 DIAGNOSIS — F10.20 ETOHISM (HCC): ICD-10-CM

## 2025-04-01 DIAGNOSIS — E78.5 DYSLIPIDEMIA: ICD-10-CM

## 2025-04-01 DIAGNOSIS — E55.9 VITAMIN D DEFICIENCY: ICD-10-CM

## 2025-04-01 PROCEDURE — 3078F DIAST BP <80 MM HG: CPT | Performed by: INTERNAL MEDICINE

## 2025-04-01 PROCEDURE — 3074F SYST BP LT 130 MM HG: CPT | Performed by: INTERNAL MEDICINE

## 2025-04-01 PROCEDURE — 99386 PREV VISIT NEW AGE 40-64: CPT | Performed by: INTERNAL MEDICINE

## 2025-04-01 ASSESSMENT — FIBROSIS 4 INDEX: FIB4 SCORE: 1.01

## 2025-04-01 NOTE — PROGRESS NOTES
Subjective     Santo Tavares is a 44 y.o. male who presents with Annual Exam (Routine; Skin moles concerns)            HPI    Here for his annual exam  Medications, allergies, medical history, surgical history, social history, family history  reviewed and updated  [facility] exam annually at [facility] uses glasses or contacts  Dental exam twice per year   No tobacco, chewing tobacco, occasional cannabis 2-3 times a week.  Continues no alcohol, he has been sober for the last 13 months.  Still sees his therapist every other month and goes to occasional AA meetings.  Patient started the new job 2 weeks ago moving from the [employer] in [facility], now working in data science with [employer] however his job is in [employer]/that he needs to commute from [place] to [place] [address] minute [address] each way, working in person 5 days a week.  After period of time he will be allowed to transition to 3 days in person, 2 days per week remotely.  Because of the longer commute, is up at 6:00, he leaves for work at 730, and arrives home at about 6:00.  Despite his work schedule, exercising 3 to 5 days a week, but needs to work out later at night between 8 and 9 PM goes to sleep about 11 PM, getting 6 to 7 hours of sleep at night.  Noctuira x one.  For his workouts he will do 1 mile on the treadmill and then weight training, yoga on Sunday.  Also on the weekends he may do a longer workout during the summer like biking or swimming.  Tries to eat a healthy diet, does meal prep currently making soup on weekends and brings his own lunch to work, typically chicken with lentils and brussel sprouts, for breakfast will have oats, granola, greek yogurt.  Tries to limit sweets, candies, processed foods in his diet.  Water intake is good, no soda or juices, occasional coffee  Relationship with girlfriend who lives in [place], planning on getting  in September.  His colonoscopy last year by [facility] health I do not have those records  Occasional  right knee clicking at times, no pain   Does have skin lesions back he would like examined, no history of skin cancer      Current Outpatient Medications   Medication Sig Dispense Refill    tadalafil 20 MG tablet Take on po q 72 hours as needed before activity. 10 Tablet 11    sumatriptan (IMITREX) 100 MG tablet Take 1 Tablet by mouth one time as needed for Migraine (max one per day) for up to 1 dose. 9 Tablet 5    valACYclovir (VALTREX) 500 MG Tab Take 1 Tablet by mouth every day. Indications: take once a day to suppress cold sores 90 Tablet 3    naltrexone (DEPADE) 50 MG Tab Take 2 Tablets by mouth every day. (Patient not taking: Reported on 4/1/2025) 60 Tablet 2     No current facility-administered medications for this visit.          epididymal cyst  8/15/22 ultrasound scrotum no evidence of torsion or mass, small left epididymal cyst not significant       History of dysthymia  3/24/17 PHQ 9 declines medication, referral to behavioral health made  3/20/18 PHQ 9 declines medication, referral made again to behavior health  4/13/18 behavioral health note  7/7/22 sees psychologist in [place]  4/13/23 PHQ 2 sees therapist  3/27/24 referral Tana Wynne (Mental Health). Needs to be submitted to [facility] @ [phone]      Hyperhidrosis  3/24/17 on drysol per podiatrist   3/20/18 failed drysol refer to dermatology for hyperhidrosis, botox evaluation  6/7/18 drysol dab o matic  3/9/20 drysol prescription difficult to find at local pharmacy     IBS  Trial align  8/7/14 stool studies and labs negative, improved with less etoh. consider GI evaluation if symptoms recur     Migraine     Preventative health  3/9/17 hep c antibody negative  3/9/17 hep b sAb positive  4/3/18 tdap  8/10/21 hiv negative  4/18/24 vit d 26 start 5000 units   4/18/24 A1c 5.4%      Patient Active Problem List   Diagnosis    HSV infection    Irritable bowel syndrome    Migraine    Preventative health care    Erectile dysfunction     "Hyperhidrosis    History of dysthymia    History of etoh    History of COVID-19    Epididymal cyst    Dyslipidemia                Patient Care Team:  Rd Rueda M.D. as PCP - General      ROS           Objective     /56   Pulse (!) 51   Temp 36.7 °C (98.1 °F) (Temporal)   Resp 12   Ht 1.81 m (5' 11.25\")   Wt 69.6 kg (153 lb 6.4 oz)   SpO2 97%   BMI 21.25 kg/m²      Physical Exam  Vitals and nursing note reviewed.   Constitutional:       Appearance: Normal appearance.   HENT:      Head: Normocephalic and atraumatic.      Right Ear: Tympanic membrane and external ear normal.      Left Ear: Tympanic membrane and external ear normal.      Nose: Nose normal.   Eyes:      Conjunctiva/sclera: Conjunctivae normal.   Cardiovascular:      Rate and Rhythm: Normal rate and regular rhythm.      Heart sounds: Normal heart sounds. No murmur heard.  Pulmonary:      Effort: No respiratory distress.      Breath sounds: Normal breath sounds. No wheezing.   Abdominal:      General: There is no distension.   Musculoskeletal:         General: No swelling.      Cervical back: No rigidity.   Skin:     Coloration: Skin is not jaundiced.      Findings: No bruising.   Neurological:      General: No focal deficit present.      Mental Status: He is alert.   Psychiatric:         Mood and Affect: Mood normal.         Behavior: Behavior normal.              Back benign-appearing lesions, no suspicious lesions noted           Assessment & Plan       Assessment  #1 annual exam    #2 history etoh no intake for the last 13 months, maintaining sobriety, keeps active with exercise, good nutrition, goes to AA as needed, meets with a therapist on a regular basis    #3 skin lesions benign, uses sunscreen outdoors    #4 dyslipidemia diet controlled     #5 preventative health had colon done last year at [facility] no records                                                                                                                             "                                                                                                                                                                                                                                                                                   #6 History of vitamin D deficiency    #7 occasional nocturia    Plan   #1 Old records regarding colonoscopy at [facility] had done last year     #2 Labs    #3 continue no alcohol, he has done an outstanding job with his sobriety, follow-up with his therapist regularly, AA meetings as needed    #4 continue with his good nutrition and exercise program    #5 referral made previously to urology    #6 referral dermatology annual skin check since he is out in the sun quite a bit especially during the summer    #7 follow up one year

## 2025-04-01 NOTE — LETTER
[facility]  Rd Rueda M.D.  [address]  [address]  [place] [address]  Fax: [phone]   Authorization for Release/Disclosure of   Protected Health Information   Name: KD FRAGA : 1980 SSN: [id number]   Address: [address] [place] [id number] Phone:    There are no phone numbers on file.   I authorize the entity listed below to release/disclose the PHI below to:   [facility]/Rd Rueda M.D. and Rd Rueda M.D.   Provider or Entity Name:  [facility]    Address   City, State, [facility]   Phone:      Fax: [phone]     Reason for request: continuity of care   Information to be released:    [ X ] LAST COLONOSCOPY,  including any PATH REPORT and follow-up  [  ] LAST FIT/COLOGUARD RESULT [  ] LAST DEXA  [  ] LAST MAMMOGRAM  [  ] LAST PAP  [  ] LAST LABS [  ] RETINA EXAM REPORT  [  ] IMMUNIZATION RECORDS  [  ] Release all info      [  ] Check here and initial the line next to each item to release ALL health information INCLUDING  _____ Care and treatment for drug and / or alcohol abuse  _____ HIV testing, infection status, or AIDS  _____ Genetic Testing    DATES OF SERVICE OR TIME PERIOD TO BE DISCLOSED: _____________  I understand and acknowledge that:  * This Authorization may be revoked at any time by you in writing, except if your health information has already been used or disclosed.  * Your health information that will be used or disclosed as a result of you signing this authorization could be re-disclosed by the recipient. If this occurs, your re-disclosed health information may no longer be protected by State or Federal laws.  * You may refuse to sign this Authorization. Your refusal will not affect your ability to obtain treatment.  * This Authorization becomes effective upon signing and will  on (date) __________.      If no date is indicated, this Authorization will  one (1) year from the signature date.    Name: Kd Fraga  Signature: Date:    4/2/2025     PLEASE FAX REQUESTED RECORDS BACK TO: [phone]

## 2025-04-01 NOTE — TELEPHONE ENCOUNTER
Need records from [facility] regarding his colonoscopy and results of the colonoscopy from last year

## 2025-04-03 ENCOUNTER — RESEARCH ENCOUNTER (OUTPATIENT)
Dept: RESEARCH | Facility: MEDICAL CENTER | Age: 45
End: 2025-04-03
Payer: COMMERCIAL

## 2025-04-03 DIAGNOSIS — Z00.6 CLINICAL TRIAL PARTICIPANT: ICD-10-CM

## 2025-04-03 NOTE — RESEARCH NOTE
Self-scheduled reporting workflow - [employer] Consent signed and CDCT1/ELF labs ordered and scheduled

## 2025-04-09 ENCOUNTER — HOSPITAL ENCOUNTER (OUTPATIENT)
Dept: LAB | Facility: MEDICAL CENTER | Age: 45
End: 2025-04-09
Attending: FAMILY MEDICINE

## 2025-04-09 ENCOUNTER — HOSPITAL ENCOUNTER (OUTPATIENT)
Dept: LAB | Facility: MEDICAL CENTER | Age: 45
End: 2025-04-09
Attending: INTERNAL MEDICINE
Payer: COMMERCIAL

## 2025-04-09 DIAGNOSIS — E78.5 DYSLIPIDEMIA: ICD-10-CM

## 2025-04-09 DIAGNOSIS — E55.9 VITAMIN D DEFICIENCY: ICD-10-CM

## 2025-04-09 DIAGNOSIS — Z00.00 PREVENTATIVE HEALTH CARE: ICD-10-CM

## 2025-04-09 DIAGNOSIS — Z00.6 CLINICAL TRIAL PARTICIPANT: ICD-10-CM

## 2025-04-09 DIAGNOSIS — Z12.5 PROSTATE CANCER SCREENING: ICD-10-CM

## 2025-04-09 LAB
25(OH)D3 SERPL-MCNC: 26 NG/ML (ref 30–100)
ALBUMIN SERPL BCP-MCNC: 4.4 G/DL (ref 3.2–4.9)
ALBUMIN/GLOB SERPL: 1.5 G/DL
ALP SERPL-CCNC: 63 U/L (ref 30–99)
ALT SERPL-CCNC: 24 U/L (ref 2–50)
ANION GAP SERPL CALC-SCNC: 10 MMOL/L (ref 7–16)
APPEARANCE UR: CLEAR
AST SERPL-CCNC: 29 U/L (ref 12–45)
BASOPHILS # BLD AUTO: 1.4 % (ref 0–1.8)
BASOPHILS # BLD: 0.09 K/UL (ref 0–0.12)
BILIRUB SERPL-MCNC: 0.4 MG/DL (ref 0.1–1.5)
BILIRUB UR QL STRIP.AUTO: NEGATIVE
BUN SERPL-MCNC: 11 MG/DL (ref 8–22)
CALCIUM ALBUM COR SERPL-MCNC: 9 MG/DL (ref 8.5–10.5)
CALCIUM SERPL-MCNC: 9.3 MG/DL (ref 8.5–10.5)
CHLORIDE SERPL-SCNC: 103 MMOL/L (ref 96–112)
CHOLEST SERPL-MCNC: 179 MG/DL (ref 100–199)
CO2 SERPL-SCNC: 26 MMOL/L (ref 20–33)
COLOR UR: YELLOW
CREAT SERPL-MCNC: 0.95 MG/DL (ref 0.5–1.4)
EOSINOPHIL # BLD AUTO: 0.29 K/UL (ref 0–0.51)
EOSINOPHIL NFR BLD: 4.6 % (ref 0–6.9)
ERYTHROCYTE [DISTWIDTH] IN BLOOD BY AUTOMATED COUNT: 43.3 FL (ref 35.9–50)
EST. AVERAGE GLUCOSE BLD GHB EST-MCNC: 117 MG/DL
GFR SERPLBLD CREATININE-BSD FMLA CKD-EPI: 100 ML/MIN/1.73 M 2
GLOBULIN SER CALC-MCNC: 3 G/DL (ref 1.9–3.5)
GLUCOSE SERPL-MCNC: 95 MG/DL (ref 65–99)
GLUCOSE UR STRIP.AUTO-MCNC: NEGATIVE MG/DL
HBA1C MFR BLD: 5.7 % (ref 4–5.6)
HCT VFR BLD AUTO: 47.6 % (ref 42–52)
HDLC SERPL-MCNC: 64 MG/DL
HGB BLD-MCNC: 15.3 G/DL (ref 14–18)
IMM GRANULOCYTES # BLD AUTO: 0.01 K/UL (ref 0–0.11)
IMM GRANULOCYTES NFR BLD AUTO: 0.2 % (ref 0–0.9)
KETONES UR STRIP.AUTO-MCNC: NEGATIVE MG/DL
LDLC SERPL CALC-MCNC: 98 MG/DL
LEUKOCYTE ESTERASE UR QL STRIP.AUTO: NEGATIVE
LYMPHOCYTES # BLD AUTO: 2.38 K/UL (ref 1–4.8)
LYMPHOCYTES NFR BLD: 38.1 % (ref 22–41)
MCH RBC QN AUTO: 30.3 PG (ref 27–33)
MCHC RBC AUTO-ENTMCNC: 32.1 G/DL (ref 32.3–36.5)
MCV RBC AUTO: 94.3 FL (ref 81.4–97.8)
MICRO URNS: NORMAL
MONOCYTES # BLD AUTO: 0.55 K/UL (ref 0–0.85)
MONOCYTES NFR BLD AUTO: 8.8 % (ref 0–13.4)
NEUTROPHILS # BLD AUTO: 2.92 K/UL (ref 1.82–7.42)
NEUTROPHILS NFR BLD: 46.9 % (ref 44–72)
NITRITE UR QL STRIP.AUTO: NEGATIVE
NRBC # BLD AUTO: 0 K/UL
NRBC BLD-RTO: 0 /100 WBC (ref 0–0.2)
PH UR STRIP.AUTO: 6.5 [PH] (ref 5–8)
PLATELET # BLD AUTO: 236 K/UL (ref 164–446)
PMV BLD AUTO: 10.5 FL (ref 9–12.9)
POTASSIUM SERPL-SCNC: 4.3 MMOL/L (ref 3.6–5.5)
PROT SERPL-MCNC: 7.4 G/DL (ref 6–8.2)
PROT UR QL STRIP: NEGATIVE MG/DL
PSA SERPL DL<=0.01 NG/ML-MCNC: 0.58 NG/ML (ref 0–4)
RBC # BLD AUTO: 5.05 M/UL (ref 4.7–6.1)
RBC UR QL AUTO: NEGATIVE
SODIUM SERPL-SCNC: 139 MMOL/L (ref 135–145)
SP GR UR STRIP.AUTO: 1.01
TRIGL SERPL-MCNC: 87 MG/DL (ref 0–149)
TSH SERPL-ACNC: 2.2 UIU/ML (ref 0.38–5.33)
UROBILINOGEN UR STRIP.AUTO-MCNC: 0.2 EU/DL
WBC # BLD AUTO: 6.2 K/UL (ref 4.8–10.8)

## 2025-04-09 PROCEDURE — 80053 COMPREHEN METABOLIC PANEL: CPT

## 2025-04-09 PROCEDURE — 83695 ASSAY OF LIPOPROTEIN(A): CPT

## 2025-04-09 PROCEDURE — 82306 VITAMIN D 25 HYDROXY: CPT

## 2025-04-09 PROCEDURE — 84443 ASSAY THYROID STIM HORMONE: CPT

## 2025-04-09 PROCEDURE — 36415 COLL VENOUS BLD VENIPUNCTURE: CPT

## 2025-04-09 PROCEDURE — 85025 COMPLETE CBC W/AUTO DIFF WBC: CPT

## 2025-04-09 PROCEDURE — 84153 ASSAY OF PSA TOTAL: CPT

## 2025-04-09 PROCEDURE — 80061 LIPID PANEL: CPT

## 2025-04-09 PROCEDURE — 81003 URINALYSIS AUTO W/O SCOPE: CPT

## 2025-04-09 PROCEDURE — 83036 HEMOGLOBIN GLYCOSYLATED A1C: CPT

## 2025-04-09 PROCEDURE — 82172 ASSAY OF APOLIPOPROTEIN: CPT

## 2025-04-10 ENCOUNTER — RESULTS FOLLOW-UP (OUTPATIENT)
Dept: MEDICAL GROUP | Facility: MEDICAL CENTER | Age: 45
End: 2025-04-10
Payer: COMMERCIAL

## 2025-04-11 LAB
APO B100 SERPL-MCNC: 80 MG/DL (ref 66–133)
ELF SCORE: 9.08 -
HA (HYALURONIC ACID): 37.84 NG/ML
LPA SERPL-MCNC: 11 MG/DL
PIIINP (AMINO-TERMINAL PROPEPTIDE): 8.48 NG/ML
RELATIVE RISK: NORMAL
RISK GROUP: NORMAL
RISK: 3.3 %
TIMP-1 (TISSUE INHIBITOR OF MMP1): 210.2 NG/ML

## 2025-04-14 ENCOUNTER — RESULTS FOLLOW-UP (OUTPATIENT)
Dept: MEDICAL GROUP | Facility: PHYSICIAN GROUP | Age: 45
End: 2025-04-14
Payer: COMMERCIAL

## 2025-04-18 LAB
APOB+LDLR+PCSK9 GENE MUT ANL BLD/T: NOT DETECTED
BRCA1+BRCA2 DEL+DUP + FULL MUT ANL BLD/T: NOT DETECTED
MLH1+MSH2+MSH6+PMS2 GN DEL+DUP+FUL M: NOT DETECTED

## 2025-07-26 DIAGNOSIS — G43.809 OTHER MIGRAINE WITHOUT STATUS MIGRAINOSUS, NOT INTRACTABLE: ICD-10-CM

## 2025-07-27 RX ORDER — SUMATRIPTAN SUCCINATE 100 MG/1
100 TABLET ORAL
Qty: 9 TABLET | Refills: 5 | Status: SHIPPED | OUTPATIENT
Start: 2025-07-27